# Patient Record
Sex: FEMALE | Race: WHITE | NOT HISPANIC OR LATINO | Employment: FULL TIME | URBAN - METROPOLITAN AREA
[De-identification: names, ages, dates, MRNs, and addresses within clinical notes are randomized per-mention and may not be internally consistent; named-entity substitution may affect disease eponyms.]

---

## 2017-01-13 ENCOUNTER — OFFICE VISIT (OUTPATIENT)
Dept: OPTOMETRY | Facility: CLINIC | Age: 42
End: 2017-01-13

## 2017-01-13 DIAGNOSIS — H52.213 IRREGULAR ASTIGMATISM, BILATERAL: ICD-10-CM

## 2017-01-13 DIAGNOSIS — H52.03 HYPEROPIA WITH ASTIGMATISM, BILATERAL: ICD-10-CM

## 2017-01-13 DIAGNOSIS — H52.203 HYPEROPIA WITH ASTIGMATISM, BILATERAL: ICD-10-CM

## 2017-01-13 DIAGNOSIS — Z98.890 HISTORY OF REFRACTIVE SURGERY: Primary | ICD-10-CM

## 2017-01-13 ASSESSMENT — REFRACTION_CURRENTRX
OD_BASECURVE: 8.1
OS_AXIS: 175
OD_BRAND: SPECIALEYES TORIC
OD_CYLINDER: -2.75
OD_CYLINDER: -2.25
OD_CYLINDER: -2.75
OD_SPHERE: +1.00
OD_AXIS: 040
OD_DIAMETER: 15.4
OD_DIAMETER: 14.3
OS_SPHERE: +1.25
OD_BASECURVE: 9.5
OS_DIAMETER: 15.4
OD_AXIS: 045
OS_BRAND: BIOFINITY TORIC TRIAL
OS_CYLINDER: -1.75
OS_AXIS: 175
OD_BRAND: BIOFINITY TORIC TRIAL
OD_SPHERE: +1.00
OS_SPHERE: +1.00
OD_SPHERE: +0.50
OS_DIAMETER: 14.3
OD_BASECURVE: 8.7
OS_BASECURVE: 8.7
OS_SPHERE: +1.25
OS_BASECURVE: 8.1
OD_AXIS: 045
OS_DIAMETER: 14.5
OD_DIAMETER: 14.5
OS_BRAND: SPECIALEYES TORIC
OS_BASECURVE: 9.5
OS_AXIS: 180

## 2017-01-13 ASSESSMENT — SLIT LAMP EXAM - LIDS
COMMENTS: NORMAL
COMMENTS: NORMAL

## 2017-01-13 ASSESSMENT — REFRACTION_MANIFEST
OS_SPHERE: -0.75
OD_AXIS: 125
OD_SPHERE: -1.25
OS_AXIS: 080
OD_CYLINDER: +2.50
OS_CYLINDER: +2.00

## 2017-01-13 ASSESSMENT — VISUAL ACUITY
OS_SC: 20/40
OD_CC: 20/25-2
OD_SC: 20/80
CORRECTION_TYPE: CONTACTS
METHOD: SNELLEN - LINEAR
OS_CC: 20/25-1

## 2017-01-13 NOTE — PROGRESS NOTES
A/P  1.) s/p PRK OU with residual correction OD>OS  -Fairly stable refraction over past month, corrects to approx 20/30 OD, 20/20- OS  -Failed with Biofinity Toric/Oasys Astig  -Initial set of Specialeyes improved vision, but fluctuating with unstable fit  -Much improved fit with post-refractive surgery Specialeyes lenses, will order trials with updated Rx    Recheck 2 months for Rx stability

## 2017-04-20 ENCOUNTER — OFFICE VISIT (OUTPATIENT)
Dept: OPHTHALMOLOGY | Facility: CLINIC | Age: 42
End: 2017-04-20
Attending: OPHTHALMOLOGY

## 2017-04-20 DIAGNOSIS — H04.123 DRY EYE SYNDROME, BILATERAL: ICD-10-CM

## 2017-04-20 DIAGNOSIS — Z48.810 AFTERCARE FOLLOWING SURGERY OF A SENSORY ORGAN: Primary | ICD-10-CM

## 2017-04-20 PROCEDURE — 99213 OFFICE O/P EST LOW 20 MIN: CPT | Mod: ZF

## 2017-04-20 ASSESSMENT — VISUAL ACUITY
OS_CC: J1
OD_PH_SC: 20/50
OS_SC: 20/40
METHOD: SNELLEN - LINEAR
OS_PH_SC: 20/30
OD_SC: 20/100
OS_SC+: +3
OD_CC: J3

## 2017-04-20 ASSESSMENT — SLIT LAMP EXAM - LIDS
COMMENTS: NORMAL
COMMENTS: NORMAL

## 2017-04-20 ASSESSMENT — TONOMETRY
OS_IOP_MMHG: 11
IOP_METHOD: TONOPEN
OD_IOP_MMHG: 9

## 2017-04-20 ASSESSMENT — CONF VISUAL FIELD
OS_NORMAL: 1
OD_NORMAL: 1

## 2017-04-20 NOTE — MR AVS SNAPSHOT
After Visit Summary   4/20/2017    Zara Roberts    MRN: [id number]           Patient Information     Date Of Birth          1975        Visit Information        Provider Department      4/20/2017 8:30 AM Rene Pena MD [facility]        Today's Diagnoses     Aftercare following surgery of a sensory organ - Both Eyes    -  1    Dry eye syndrome, bilateral           Follow-ups after your visit        Your next 10 appointments already scheduled     Apr 28, 2017  8:30 AM CDT   Return Visit with Saima Goldsmith OD   Eye Clinic ([facility])    [facility] 9th Fl  Clinic 9a  [address] [place] [address]   [phone]              Who to contact     Please call your clinic at [phone] to:    Ask questions about your health    Make or cancel appointments    Discuss your medicines    Learn about your test results    Speak to your doctor   If you have compliments or concerns about an experience at your clinic, or if you wish to file a complaint, please contact [facility] Patient Relations at [phone] or email us at [email or web address]         Additional Information About Your Visit        MyChart Information     [employer] gives you secure access to your electronic health record. If you see a primary care provider, you can also send messages to your care team and make appointments. If you have questions, please call your primary care clinic.  If you do not have a primary care provider, please call [phone] and they will assist you.      [employer] is an electronic gateway that provides easy, online access to your medical records. With [employer] (formerly [employer]), you can request a clinic appointment, read your test results, renew a prescription or communicate with your care team.     To access your existing account, please contact your [facility] Clinic or call [phone] for assistance.         Care EveryWhere ID     This is your Care EveryWhere ID. This could be used by other organizations to access your [place] medical records  [id number]         Blood Pressure from Last 3 Encounters:   09/03/14 122/80   01/24/14 104/68   01/11/13 110/70    Weight from Last 3 Encounters:   09/03/14 67.1 kg (148 lb)   01/24/14 64.4 kg (142 lb)   01/11/13 63.4 kg (139 lb 12 oz)              Today, you had the following     No orders found for display       Primary Care Provider Office Phone # Fax #    Evelyn Julian [phone]       [facility] [address] [facility] [address]        Thank you!     Thank you for choosing EYE CLINIC  for your care. Our goal is always to provide you with excellent care. Hearing back from our patients is one way we can continue to improve our services. Please take a few minutes to complete the written survey that you may receive in the mail after your visit with us. Thank you!             Your Updated Medication List - Protect others around you: Learn how to safely use, store and throw away your medicines at www.disposemymeds.org.          This list is accurate as of: 4/20/17 11:59 PM.  Always use your most recent med list.                   Brand Name Dispense Instructions for use    ibuprofen 200 MG tablet   Generic drug:  ibuprofen      Take 200 mg by mouth every 4 hours as needed.       prednisoLONE acetate 1 % ophthalmic susp    PRED FORTE    1 Bottle    Place 1 drop into both eyes 4 times daily       TYLENOL 325 MG tablet   Generic drug:  acetaminophen      Take 325-650 mg by mouth every 6 hours as needed.

## 2017-04-20 NOTE — NURSING NOTE
Chief Complaints and History of Present Illnesses   Patient presents with     Follow Up For     s/p  PRK, Both eyes     HPI    Affected eye(s):  Both   Symptoms:     Blurred vision   Decreased vision   No floaters   No flashes   No tearing   Dryness   No photophobia      Duration:  4 months         Comments:  Pt stated right eye vision is hazy, left eye stable over the last 4 months.  AT's 2 times daily.  Antoni Saunders  8:36 AM April 20, 2017

## 2017-04-20 NOTE — PROGRESS NOTES
HPI: Zara Roberts is a 41 year old female with history of high myopia who presents in follow-up after PRK     Vision is still blurry OD but stable since last visit.    POHx:  PRK OU 9/26/2016    Assessment & Plan   Zara Roberts is a 41 year old female with the following diagnoses:     1. POM #7 PRK, Both eyes     - Persistent corneal haze OD and irregular astigmatism right eye  - due to delayed healing, CTL falling out early in post op course   - Most recently Saw Dr. Goldsmith in Jan, tried several soft CTLs. Currently wearing Special eyes toric but dissatisfied with fit / comfort / vision   - [place] today shows inferior paracentral elevation with irregularity inferiorly    - gas permeable will likely get her to 20/20 vision right eye   - she will again f/u with Dr. Goldsmith.    - discussed again possible enhancement, but will likely not improve vision to 20/20 in the right eye. Also given thinning right eye, would not recommend enhancement of the right eye. She elects to try additional CTL and glasses at this point.     RTC in 6 months or earlier if needed.     Ron Collins MD  Ophthalmology resident, PGY-3      ~~~~~~~~~~~~~~~~~~~~~~~~~~~~~~~~~~~~~~~~~~~~~~~~~~~~~~~~~~~~~~~~    Complete documentation of historical and exam elements from today's encounter can be found in the full encounter summary report (not reduplicated in this progress note). I personally obtained the chief complaint(s) and history of present illness.  I confirmed and edited as necessary the review of systems, past medical/surgical history, family history, social history, and examination findings as documented by others.  I examined the patient myself, and I personally reviewed the relevant tests, images, and reports as documented above. I formulated and edited as necessary the assessment and plan and discussed the findings and management plan with the patient and family.     Rene Pena MD, MA  Director, Cornea & Anterior  Segment  [facility] Department of Ophthalmology & Visual Neuroscience

## 2017-04-28 ENCOUNTER — OFFICE VISIT (OUTPATIENT)
Dept: OPTOMETRY | Facility: CLINIC | Age: 42
End: 2017-04-28

## 2017-04-28 DIAGNOSIS — Z98.890 HISTORY OF REFRACTIVE SURGERY: ICD-10-CM

## 2017-04-28 DIAGNOSIS — H52.213 IRREGULAR ASTIGMATISM, BILATERAL: Primary | ICD-10-CM

## 2017-04-28 ASSESSMENT — REFRACTION_CURRENTRX
OS_BASECURVE: 7.7
OD_BRAND: SPECIALEYES TORIC
OS_BRAND: SPECIALEYES TORIC
OD_AXIS: 035
OD_BRAND: ULTRAHEALTH FC
OS_DIAMETER: 14.5
OD_BASECURVE: 8.1
OS_DIAMETER: 14.3
OS_DIAMETER: 14.5
OS_SPHERE: -2.50
OD_DIAMETER: 14.5
OS_CYLINDER: -2.00
OD_BASECURVE: 255
OD_SPHERE: -3.50
OS_SPHERE: +1.25
OD_SPHERE: +1.25
OD_BASECURVE: 7.9
OS_BASECURVE: 205
OS_AXIS: 170
OD_DIAMETER: 14.3
OD_CYLINDER: -2.50
OS_BASECURVE: 8.1
OD_DIAMETER: 14.5
OD_BRAND: DUETTE
OS_BRAND: DUETTE
OS_BRAND: ULTRAHEALTH FC

## 2017-04-28 ASSESSMENT — VISUAL ACUITY
OD_CC: 20/60
OS_CC: 20/40
METHOD: SNELLEN - LINEAR
CORRECTION_TYPE: CONTACTS

## 2017-04-28 ASSESSMENT — SLIT LAMP EXAM - LIDS
COMMENTS: NORMAL
COMMENTS: NORMAL

## 2017-04-28 NOTE — PROGRESS NOTES
A/P  1.) s/p PRK OU with residual correction OD>OS  -Now with worsening irregular astig OD  -Refit to Duette with much improved BCVA (20/25 OD, 20/20 OS)  -Good initial fit/comfort  -Reviewed hybrid CL care and hygiene with pt  -Mail trials to pt    RTC 3-4 weeks for CL recheck

## 2017-04-28 NOTE — MR AVS SNAPSHOT
After Visit Summary   4/28/2017    Zara Roberts    MRN: [id number]           Patient Information     Date Of Birth          1975        Visit Information        Provider Department      4/28/2017 8:30 AM Saima Goldsmith, [facility]        Today's Diagnoses     Irregular astigmatism, bilateral    -  1    History of refractive surgery           Follow-ups after your visit        Who to contact     Please call your clinic at [phone] to:    Ask questions about your health    Make or cancel appointments    Discuss your medicines    Learn about your test results    Speak to your doctor   If you have compliments or concerns about an experience at your clinic, or if you wish to file a complaint, please contact [facility] Patient Relations at [phone] or email us at [email or web address]         Additional Information About Your Visit        MyChart Information     Liquidt gives you secure access to your electronic health record. If you see a primary care provider, you can also send messages to your care team and make appointments. If you have questions, please call your primary care clinic.  If you do not have a primary care provider, please call [phone] and they will assist you.      TicketFire is an electronic gateway that provides easy, online access to your medical records. With TicketFire, you can request a clinic appointment, read your test results, renew a prescription or communicate with your care team.     To access your existing account, please contact your [facility] Clinic or call [phone] for assistance.        Care EveryWhere ID     This is your Care EveryWhere ID. This could be used by other organizations to access your [place] medical records  [id number]         Blood Pressure from Last 3 Encounters:   09/03/14 122/80   01/24/14 104/68   01/11/13 110/70    Weight from Last 3 Encounters:    09/03/14 67.1 kg (148 lb)   01/24/14 64.4 kg (142 lb)   01/11/13 63.4 kg (139 lb 12 oz)              Today, you had the following     No orders found for display       Primary Care Provider Office Phone # Fax #    Evelyn Julian [phone]       [facility] [address]  [facility] [address]        Thank you!     Thank you for choosing EYE CLINIC  for your care. Our goal is always to provide you with excellent care. Hearing back from our patients is one way we can continue to improve our services. Please take a few minutes to complete the written survey that you may receive in the mail after your visit with us. Thank you!             Your Updated Medication List - Protect others around you: Learn how to safely use, store and throw away your medicines at www.disposemymeds.org.      Notice  As of 4/28/2017 10:49 AM    You have not been prescribed any medications.

## 2019-01-31 ENCOUNTER — TELEPHONE (OUTPATIENT)
Dept: FAMILY MEDICINE | Facility: CLINIC | Age: 44
End: 2019-01-31

## 2019-01-31 NOTE — TELEPHONE ENCOUNTER
"Patient sent a web request today:    \"Annual Exam, Establish new Primary Care.\"    Patient would like to schedule with Debra Crum DO at  Clinic 2/15 (any time) or 4/16 (noon-5pm CST).    Please contact patient.    Thank you.    Central Scheduling  Dannielle LU  "

## 2019-04-10 ENCOUNTER — VIRTUAL VISIT (OUTPATIENT)
Dept: FAMILY MEDICINE | Facility: OTHER | Age: 44
End: 2019-04-10

## 2019-04-10 NOTE — PROGRESS NOTES
"Date:   Clinician: Dharmesh Juarez  Clinician NPI: [id number]  Patient: Zara Roberts  Patient : 1975  Patient Address: [address] [place] [address]  Patient Phone: [phone]  Visit Protocol: Eye conditions  Patient Summary:  Zara is a 43 year old (: 1975 ) female who initiated a Visit for stye.  When asked the question \"Please sign me up to receive news, health information and promotions. \", Zara responded \"Yes\".   A synchronous visit is necessary because the patient reported the following abnormal symptoms:   Sensitivity to light (requires clarification)   Images of her eye condition were uploaded.   Her symptoms started 1-2 days ago and affect the left eye. The symptoms consist of eyelid swelling, light sensitivity, and itchy eye(s). She also feels feverish but her temperature could not be measured.   Symptom details   Itchiness: Zara has seasonal allergies or hay fever.   Denied symptoms include drainage coming from the eye(s), eye redness, bumps on the eyelid, and eye pain. Zara does not have subconjunctival hemorrhage. Visual acuity was unable to be evaluated.   Precipitating events  Just before the symptoms started, she got dust or dirt in her eyes.   She has not had a recent eye surgery, eye injury, and cold or ear infection. She does not wear contact lenses.   Pertinent medical history  Zara has not ever been diagnosed with glaucoma.   Zara has been using medication to treat her current symptoms. Medication used to treat current symptoms as reported by the patient (free text): Zyrtic (over the counter allergy as I thought this may be realted to allergies, but its not helping the discomfort)   Medication efficacy as reported by the patient (free text): not helping   Zara does not require proof of evaluation of her eye condition before returning to school, work, or .   Zara does not smoke or use smokeless tobacco.   She denies pregnancy " and denies breastfeeding. She has menstruated in the past month.   Additional information as reported by the patient (free text): There is slight redness behind upper lid which is where it feels like there is something there.  Overnight I woke in severe discomfort, tried to flush eye, flushing eye helped a little. Over day today, it feels worse, but I see nothing in eye.   MEDICATIONS: Zyrtec oral, Daily Multivitamin-Minerals oral, ALLERGIES: NKDA  Clinician Response:  Dear Zara,  I am sorry you are not feeling well. Your health is our priority. To determine the most appropriate care for you, I would like you to be seen in person to further discuss your health history and symptoms.  You will not be charged for this Visit. Thank you for trusting us with your care.   Diagnosis: Refer for additional evaluation  Diagnosis ICD: R69  Triage Notes: I reviewed the patient's history, verified their identity, and explained the phone visit process.    Patient referred out  Synchronous Triage: phone, status: completed, duration: 175 seconds

## 2019-04-16 ENCOUNTER — HOSPITAL ENCOUNTER (OUTPATIENT)
Dept: MAMMOGRAPHY | Facility: CLINIC | Age: 44
Discharge: HOME | End: 2019-04-16
Attending: PHYSICIAN ASSISTANT | Admitting: PHYSICIAN ASSISTANT
Payer: COMMERCIAL

## 2019-04-16 ENCOUNTER — OFFICE VISIT (OUTPATIENT)
Dept: FAMILY MEDICINE | Facility: CLINIC | Age: 44
End: 2019-04-16
Payer: COMMERCIAL

## 2019-04-16 VITALS
WEIGHT: 158 LBS | SYSTOLIC BLOOD PRESSURE: 127 MMHG | HEIGHT: 70 IN | OXYGEN SATURATION: 97 % | TEMPERATURE: 98.1 F | BODY MASS INDEX: 22.62 KG/M2 | DIASTOLIC BLOOD PRESSURE: 82 MMHG | HEART RATE: 90 BPM

## 2019-04-16 DIAGNOSIS — Z12.31 VISIT FOR SCREENING MAMMOGRAM: ICD-10-CM

## 2019-04-16 DIAGNOSIS — Z80.8 FH: THYROID CANCER: ICD-10-CM

## 2019-04-16 DIAGNOSIS — D50.9 IRON DEFICIENCY ANEMIA, UNSPECIFIED IRON DEFICIENCY ANEMIA TYPE: ICD-10-CM

## 2019-04-16 DIAGNOSIS — Z12.4 CERVICAL CANCER SCREENING: ICD-10-CM

## 2019-04-16 DIAGNOSIS — Z00.00 ROUTINE GENERAL MEDICAL EXAMINATION AT A HEALTH CARE FACILITY: Primary | ICD-10-CM

## 2019-04-16 LAB
ERYTHROCYTE [DISTWIDTH] IN BLOOD BY AUTOMATED COUNT: 17.3 % (ref 10–15)
HCT VFR BLD AUTO: 33.7 % (ref 35–47)
HGB BLD-MCNC: 10.7 G/DL (ref 11.7–15.7)
MCH RBC QN AUTO: 26.2 PG (ref 26.5–33)
MCHC RBC AUTO-ENTMCNC: 31.8 G/DL (ref 31.5–36.5)
MCV RBC AUTO: 82 FL (ref 78–100)
PLATELET # BLD AUTO: 279 10E9/L (ref 150–450)
RBC # BLD AUTO: 4.09 10E12/L (ref 3.8–5.2)
WBC # BLD AUTO: 5.5 10E9/L (ref 4–11)

## 2019-04-16 PROCEDURE — 82728 ASSAY OF FERRITIN: CPT | Performed by: PHYSICIAN ASSISTANT

## 2019-04-16 PROCEDURE — 99386 PREV VISIT NEW AGE 40-64: CPT | Performed by: PHYSICIAN ASSISTANT

## 2019-04-16 PROCEDURE — G0145 SCR C/V CYTO,THINLAYER,RESCR: HCPCS | Performed by: PHYSICIAN ASSISTANT

## 2019-04-16 PROCEDURE — 36415 COLL VENOUS BLD VENIPUNCTURE: CPT | Performed by: PHYSICIAN ASSISTANT

## 2019-04-16 PROCEDURE — 77067 SCR MAMMO BI INCL CAD: CPT

## 2019-04-16 PROCEDURE — 85027 COMPLETE CBC AUTOMATED: CPT | Performed by: PHYSICIAN ASSISTANT

## 2019-04-16 PROCEDURE — 84443 ASSAY THYROID STIM HORMONE: CPT | Performed by: PHYSICIAN ASSISTANT

## 2019-04-16 PROCEDURE — 87624 HPV HI-RISK TYP POOLED RSLT: CPT | Performed by: PHYSICIAN ASSISTANT

## 2019-04-16 ASSESSMENT — MIFFLIN-ST. JEOR: SCORE: 1452.17

## 2019-04-16 NOTE — PROGRESS NOTES
SUBJECTIVE:   CC: Zara Roberts is an 43 year old woman who presents for preventive health visit.     Has hx of MATTIE felt to be due to menorrhagia in the past but still lasted 6-7 days  Has tried to take iron in the past but would get too constipated  She does take MVI with iron  She had a tubal ligation  She has a 15 yo boy and twin 12 yo sons  Has glucose and lipids check at work so next time done will have sent to me  Has never had a mammogram    Healthy Habits:     Getting at least 3 servings of Calcium per day:  Yes    Bi-annual eye exam:  Yes    Dental care twice a year:  Yes    Sleep apnea or symptoms of sleep apnea:  None    Diet:  Regular (no restrictions)    Frequency of exercise:  2-3 days/week    Duration of exercise:  15-30 minutes    Taking medications regularly:  Not Applicable    Medication side effects:  Not applicable    PHQ-2 Total Score: 1    Additional concerns today:  Yes      Today's PHQ-2 Score:   PHQ-2 (  [employer]) 2019   Q1: Little interest or pleasure in doing things 0   Q2: Feeling down, depressed or hopeless 0   PHQ-2 Score 0   Q1: Little interest or pleasure in doing things -   Q2: Feeling down, depressed or hopeless -   PHQ-2 Score -       Abuse: Current or Past(Physical, Sexual or Emotional)- No  Do you feel safe in your environment? Yes    Social History     Tobacco Use     Smoking status: Former Smoker     Packs/day: 0.50     Years: 5.00     Pack years: 2.50     Types: Cigarettes     Last attempt to quit: 2013     Years since quittin.9     Smokeless tobacco: Never Used   Substance Use Topics     Alcohol use: Yes     Comment: 5 drinks weekly           AUDIT - Alcohol Use Disorders Identification Test - Reproduced from the World Health Organization Audit 2001 (Second Edition) 2019   1.  How often do you have a drink containing alcohol? 4 or more times a week   2.  How many drinks containing alcohol do you have on a typical day when you are drinking? 1 or  2   3.  How often do you have five or more drinks on one occasion? Never   4.  How often during the last year have you found that you were not able to stop drinking once you had started? Never   5.  How often during the last year have you failed to do what was normally expected of you because of drinking? Never   6.  How often during the last year have you needed a first drink in the morning to get yourself going after a heavy drinking session? Never   7.  How often during the last year have you had a feeling of guilt or remorse after drinking? Never   8.  How often during the last year have you been unable to remember what happened the night before because of your drinking? Never   9.  Have you or someone else been injured because of your drinking? No   10. Has a relative, friend, doctor or other health care worker been concerned about your drinking or suggested you cut down? No   TOTAL SCORE 4       Reviewed orders with patient.  Reviewed health maintenance and updated orders accordingly - Yes      Mammogram not appropriate for this patient based on age.    Pertinent mammograms are reviewed under the imaging tab.  History of abnormal Pap smear: NO - age 30- 65 PAP every 3 years recommended  PAP / HPV 1/24/2014   PAP NIL     Reviewed and updated as needed this visit by clinical staff  Tobacco  Allergies  Meds  Problems  Med Hx  Surg Hx         Reviewed and updated as needed this visit by Provider  Meds  Problems  Med Hx  Surg Hx            Review of Systems  CONSTITUTIONAL: NEGATIVE for fever, chills, change in weight  INTEGUMENTARU/SKIN: NEGATIVE for worrisome rashes, moles or lesions  EYES: NEGATIVE for vision changes or irritation  ENT: NEGATIVE for ear, mouth and throat problems  RESP: NEGATIVE for significant cough or SOB  BREAST: NEGATIVE for masses, tenderness or discharge  CV: NEGATIVE for chest pain, palpitations or peripheral edema  GI: NEGATIVE for nausea, abdominal pain, heartburn, or change  "in bowel habits  : NEGATIVE for unusual urinary or vaginal symptoms. Periods are regular.  MUSCULOSKELETAL: NEGATIVE for significant arthralgias or myalgia  NEURO: NEGATIVE for weakness, dizziness or paresthesias  PSYCHIATRIC: NEGATIVE for changes in mood or affect     OBJECTIVE:   /82 (BP Location: Right arm, Patient Position: Sitting, Cuff Size: Adult Regular)   Pulse 90   Temp 98.1  F (36.7  C) (Oral)   Ht 1.77 m (5' 9.7\")   Wt 71.7 kg (158 lb)   LMP 03/29/2019 (Exact Date)   SpO2 97%   Breastfeeding? No   BMI 22.87 kg/m    Physical Exam  GENERAL: healthy, alert and no distress  EYES: Eyes grossly normal to inspection, PERRL and conjunctivae and sclerae normal  HENT: ear canals and TM's normal, nose and mouth without ulcers or lesions  NECK: no adenopathy, no asymmetry, masses, or scars and thyroid normal to palpation  RESP: lungs clear to auscultation - no rales, rhonchi or wheezes  BREAST: normal without masses, tenderness or nipple discharge and no palpable axillary masses or adenopathy  CV: regular rate and rhythm, normal S1 S2, no S3 or S4, no murmur, click or rub, no peripheral edema and peripheral pulses strong  ABDOMEN: soft, nontender, no hepatosplenomegaly, no masses and bowel sounds normal  MS: no gross musculoskeletal defects noted, no edema  SKIN: no suspicious lesions or rashes  NEURO: Normal strength and tone, mentation intact and speech normal  PSYCH: mentation appears normal, affect normal/bright        ASSESSMENT/PLAN:   Assessment and Plan:     (Z00.00) Routine general medical examination at a health care facility  (primary encounter diagnosis)  Comment:   Plan: Recd pt go for her first mammogram today.  She has biometric screening at work and labs done there.    (D50.9) Iron deficiency anemia, unspecified iron deficiency anemia type  Comment:   Plan: CBC with platelets, Ferritin        Periods are not as heavy. Has had trouble tolerating oral iron.    (Z12.4) Cervical cancer " "screening  Comment:   Plan: Pap imaged thin layer screen with HPV -         recommended age 30 - 65 years (select HPV order        below), HPV High Risk Types DNA Cervical            (Z80.8) FH: thyroid cancer  Comment:   Plan: TSH                COUNSELING:  Reviewed preventive health counseling, as reflected in patient instructions    BP Readings from Last 1 Encounters:   04/16/19 127/82     Estimated body mass index is 22.87 kg/m  as calculated from the following:    Height as of this encounter: 1.77 m (5' 9.7\").    Weight as of this encounter: 71.7 kg (158 lb).           reports that she quit smoking about 5 years ago. Her smoking use included cigarettes. She has a 2.50 pack-year smoking history. She has never used smokeless tobacco.      Counseling Resources:  ATP IV Guidelines  Pooled Cohorts Equation Calculator  Breast Cancer Risk Calculator  FRAX Risk Assessment  ICSI Preventive Guidelines  Dietary Guidelines for Americans, 2010  USDA's MyPlate  ASA Prophylaxis  Lung CA Screening    Debra Crum PA-C  [facility]  "

## 2019-04-17 LAB
FERRITIN SERPL-MCNC: 12 NG/ML (ref 12–150)
TSH SERPL DL<=0.005 MIU/L-ACNC: 0.75 MU/L (ref 0.4–4)

## 2019-04-17 NOTE — RESULT ENCOUNTER NOTE
Reva,    Your TSH (thyroid blood test) was normal.  Your hemoglobin was low at 10.7 which is actually stable for your.  Your iron stores (ferritin) were on the low side.  See if you can tolerate taking slow release iron every other day.  This is available over the counter.    Let me know if you have any questions.    Debra Crum PA-C

## 2019-04-18 LAB
COPATH REPORT: NORMAL
PAP: NORMAL

## 2019-04-19 LAB
FINAL DIAGNOSIS: NORMAL
HPV HR 12 DNA CVX QL NAA+PROBE: NEGATIVE
HPV16 DNA SPEC QL NAA+PROBE: NEGATIVE
HPV18 DNA SPEC QL NAA+PROBE: NEGATIVE
SPECIMEN DESCRIPTION: NORMAL
SPECIMEN SOURCE CVX/VAG CYTO: NORMAL

## 2019-10-04 ENCOUNTER — HEALTH MAINTENANCE LETTER (OUTPATIENT)
Age: 44
End: 2019-10-04

## 2019-11-18 ENCOUNTER — THERAPY VISIT (OUTPATIENT)
Dept: PHYSICAL THERAPY | Facility: CLINIC | Age: 44
End: 2019-11-18
Payer: COMMERCIAL

## 2019-11-18 DIAGNOSIS — M54.2 CERVICAL PAIN: ICD-10-CM

## 2019-11-18 PROCEDURE — 97161 PT EVAL LOW COMPLEX 20 MIN: CPT | Mod: GP | Performed by: PHYSICAL THERAPIST

## 2019-11-18 PROCEDURE — 97110 THERAPEUTIC EXERCISES: CPT | Mod: GP | Performed by: PHYSICAL THERAPIST

## 2019-11-18 PROCEDURE — 97140 MANUAL THERAPY 1/> REGIONS: CPT | Mod: GP | Performed by: PHYSICAL THERAPIST

## 2019-11-19 PROBLEM — M54.2 CERVICAL PAIN: Status: ACTIVE | Noted: 2019-11-19

## 2019-11-19 NOTE — PROGRESS NOTES
[facility] Initial Evaluation    Physical Therapy Initial Examination/Evaluation  November 18, 2019    Zara Roberts  is a 44 year old  female referred to physical therapy as a self referral for treatment of neck pain and headaches.      DOI/onset most recently 10-18-19.   Mechanism of injury No specific incident. Patient has a hx of neck pain off and on x 10 years with increased computer work at her job. Normally a few chiropractic visits resolves the pain. This time she has had 4 chiropractic treatments with no relief and has daily headaches which she normally does not have.   DOS n/a  Prior treatment 4 chiropractic treatments with adjustments. Effect of prior treatment usually beneficial. This time it has not helped prompting patient to seek PT.    Chief Complaint:   Neck pain and headaches.   Pain location: B cervical/scapular musculature.  Quality: sharp along with dull headaches  Constant/Intermittent: constant but the severity fluctuates.  Time of day: worse as day progresses  Symptoms have worsened since onset.    Current pain 8/10.  Pain at best 4/10.  Pain at worst 8/10.    Symptoms aggravated by computer work.    Symptoms improved with movement/activity.     Social history:   with 3 children.    Occupation: .  Job duties:  Computer/desk work.    Patient having difficulty with ADLs: none.    Patient's goals are to reduce pain and headaches.    Patient reports general health as good.  Related medical history 10 year hx of neck pain off and on. Patient has an average of 2 episodes per year    Surgical History:  none.    Imaging: none.    Medications:  none.       Return to MD:  Self referral.      Clinical Impression: Patient should do well with a comprehensive exercise program in PT along with manual therapy. If progress is not seen in3-4 weeks she will see her MD for diagnostics.    Subjective:      General health as reported by patient is good.      Other  surgery history details: cesarian 2008.  Current medications:  Anti-inflammatory.   Primary job tasks include:  Computer work and prolonged sitting.           Patient is .                           Objective:  Standing Alignment:    Cervical/Thoracic:  Forward head and cervical lordosis decreased  Shoulder/UE:  Rounded shoulders                  Flexibility/Screens:   Positive screens:  Cervical and Thoracic  Upper Extremity:    Decreased left upper extremity flexibility at:  Pectoralis Major    Decreased right upper extremity flexibility present at:  Pectoralis Major    Spine:  Decreased left spine flexibility:  Rhomboids; Upper Trap and Levator    Decreased right spine flexibility:  Rhomboids; Upper Trap and Levator                  Cervical/Thoracic Evaluation    AROM:  AROM Cervical:    Flexion:            75%  Extension:       Normal  Rotation:         Left: normal     Right: 75%  Side Bend:      Left: 75%     Right:  75%    Strength: B scapular stabilizers 4/5  Headaches: cervical  Cervical Myotomes:  normal                      Cervical Dermatomes:  normal                    Cervical Palpation:    Tenderness present at Left:    Rhomboids; Upper Trap; Erector Spinae and Suboccipitals  Tenderness present at Right:    Rhomboids; Upper Trap; Erector Spinae and Suboccipitals      Spinal Segmental Conclusions:  Pain with P/A glide C6-7, T 4-6  Level:  Hypo at C5, C6, C7, T3 and T4                                                General     ROS    Assessment/Plan:    Patient is a 44 year old female with cervical complaints.    Patient has the following significant findings with corresponding treatment plan.                Diagnosis 1:  Cervical pain  Pain -  manual therapy, self management and education  Decreased ROM/flexibility - manual therapy and therapeutic exercise  Decreased strength - therapeutic exercise and therapeutic activities  Impaired muscle performance - neuro re-education  Decreased  function - therapeutic activities  Impaired posture - neuro re-education    Therapy Evaluation Codes:   1) History comprised of:   Personal factors that impact the plan of care:      Time since onset of symptoms.    Comorbidity factors that impact the plan of care are:      None.     Medications impacting care: None.  2) Examination of Body Systems comprised of:   Body structures and functions that impact the plan of care:      Cervical spine.   Activity limitations that impact the plan of care are:      Reading/Computer work.  3) Clinical presentation characteristics are:   Stable/Uncomplicated.  4) Decision-Making    Low complexity using standardized patient assessment instrument and/or measureable assessment of functional outcome.  Cumulative Therapy Evaluation is: Low complexity.    Previous and current functional limitations:  (See Goal Flow Sheet for this information)    Short term and Long term goals: (See Goal Flow Sheet for this information)     Communication ability:  Patient appears to be able to clearly communicate and understand verbal and written communication and follow directions correctly.  Treatment Explanation - The following has been discussed with the patient:   RX ordered/plan of care  Anticipated outcomes  Possible risks and side effects  This patient would benefit from PT intervention to resume normal activities.   Rehab potential is good.    Frequency:  1 X week, once daily  Duration:  for 6 weeks  Discharge Plan:  Achieve all LTG.  Independent in home treatment program.  Reach maximal therapeutic benefit.    Please refer to the daily flowsheet for treatment today, total treatment time and time spent performing 1:1 timed codes.

## 2019-11-25 ENCOUNTER — THERAPY VISIT (OUTPATIENT)
Dept: PHYSICAL THERAPY | Facility: CLINIC | Age: 44
End: 2019-11-25
Payer: COMMERCIAL

## 2019-11-25 DIAGNOSIS — M54.2 CERVICAL PAIN: ICD-10-CM

## 2019-11-25 PROCEDURE — 97140 MANUAL THERAPY 1/> REGIONS: CPT | Mod: GP | Performed by: PHYSICAL THERAPIST

## 2019-11-25 PROCEDURE — 97110 THERAPEUTIC EXERCISES: CPT | Mod: GP | Performed by: PHYSICAL THERAPIST

## 2019-11-25 PROCEDURE — 97112 NEUROMUSCULAR REEDUCATION: CPT | Mod: GP | Performed by: PHYSICAL THERAPIST

## 2019-12-09 ENCOUNTER — THERAPY VISIT (OUTPATIENT)
Dept: PHYSICAL THERAPY | Facility: CLINIC | Age: 44
End: 2019-12-09
Payer: COMMERCIAL

## 2019-12-09 DIAGNOSIS — M54.2 CERVICAL PAIN: ICD-10-CM

## 2019-12-09 PROCEDURE — 97110 THERAPEUTIC EXERCISES: CPT | Mod: GP | Performed by: PHYSICAL THERAPIST

## 2019-12-09 PROCEDURE — 97112 NEUROMUSCULAR REEDUCATION: CPT | Mod: GP | Performed by: PHYSICAL THERAPIST

## 2019-12-09 PROCEDURE — 97140 MANUAL THERAPY 1/> REGIONS: CPT | Mod: GP | Performed by: PHYSICAL THERAPIST

## 2019-12-10 NOTE — PROGRESS NOTES
Subjective:  HPI                    Objective:  System    Physical Exam    General     ROS    Assessment/Plan:    SUBJECTIVE  Subjective changes as noted by pt:   Pt. continued to feel better overall with less neck pain and headaches until the past 3-4 days where she has had her headaches return and had increased neck/upper back pain again for unknown reasons.   Current pain level: 7/10   Changes in function:  Yes (See Goal flowsheet attached for changes in current functional level)     Adverse reaction to treatment or activity:  None    OBJECTIVE  Changes in objective findings:  Palpation - MF tightness/tenderness in B cervical/scapular musculature and suboccipital area. ROM - cerv B SB 75%.      ASSESSMENT  Zara continues to require intervention to meet STG and LTG's: PT  Patient has experienced an exacerbation of symptoms.  Response to therapy has shown an improvement in  pain level and ROM   Progress made towards STG/LTG?  Yes (See Goal flowsheet attached for updates on achievement of STG and LTG)    PLAN  Current treatment program is being advanced to more complex exercises.    PTA/ATC plan:  N/A    Please refer to the daily flowsheet for treatment today, total treatment time and time spent performing 1:1 timed codes.

## 2019-12-17 ENCOUNTER — THERAPY VISIT (OUTPATIENT)
Dept: PHYSICAL THERAPY | Facility: CLINIC | Age: 44
End: 2019-12-17
Payer: COMMERCIAL

## 2019-12-17 DIAGNOSIS — M54.2 CERVICAL PAIN: ICD-10-CM

## 2019-12-17 PROCEDURE — 97110 THERAPEUTIC EXERCISES: CPT | Mod: GP | Performed by: PHYSICAL THERAPIST

## 2019-12-17 PROCEDURE — 97140 MANUAL THERAPY 1/> REGIONS: CPT | Mod: GP | Performed by: PHYSICAL THERAPIST

## 2020-03-26 PROBLEM — M54.2 CERVICAL PAIN: Status: RESOLVED | Noted: 2019-11-19 | Resolved: 2020-03-26

## 2020-03-26 NOTE — PROGRESS NOTES
Patient has not returned since the visit on 12-13-19. Therefore we will discharge patient from PT at this time and resolve the episode.

## 2020-11-08 ENCOUNTER — HEALTH MAINTENANCE LETTER (OUTPATIENT)
Age: 45
End: 2020-11-08

## 2020-11-17 ENCOUNTER — VIRTUAL VISIT (OUTPATIENT)
Dept: FAMILY MEDICINE | Facility: OTHER | Age: 45
End: 2020-11-17

## 2020-11-17 DIAGNOSIS — Z20.822 SUSPECTED 2019-NCOV INFECTION: Primary | ICD-10-CM

## 2020-11-17 PROCEDURE — U0003 INFECTIOUS AGENT DETECTION BY NUCLEIC ACID (DNA OR RNA); SEVERE ACUTE RESPIRATORY SYNDROME CORONAVIRUS 2 (SARS-COV-2) (CORONAVIRUS DISEASE [COVID-19]), AMPLIFIED PROBE TECHNIQUE, MAKING USE OF HIGH THROUGHPUT TECHNOLOGIES AS DESCRIBED BY CMS-2020-01-R: HCPCS | Mod: 90 | Performed by: PATHOLOGY

## 2020-11-17 PROCEDURE — 99000 SPECIMEN HANDLING OFFICE-LAB: CPT | Performed by: PATHOLOGY

## 2020-11-17 NOTE — PROGRESS NOTES
"Date: 2020 09:02:06  Clinician: Dharmesh Juarez  Clinician NPI: [id number]  Patient: Zara Roberts  Patient : 1975  Patient Address: [address] [place] [address]  Patient Phone: [phone]  Visit Protocol: URI  Patient Summary:  Zara is a 45 year old ( : 1975 ) female who initiated a [facility] Visit for COVID-19 (Coronavirus) evaluation and screening. When asked the question \"Please sign me up to receive news, health information and promotions. \", Zara responded \"Yes\".    Zara states her symptoms started gradually 3-4 days ago. After her symptoms started, they improved and then got worse again.   Her symptoms consist of vomiting, rhinitis, myalgia, malaise, a sore throat, and a headache. She is experiencing difficulty breathing due to nasal congestion but she is not short of breath. Zara also feels feverish.   Symptom details     Nasal secretions: The color of her mucus is green.    Sore throat: Zara reports having mild throat pain (1-3 on a 10 point pain scale), does not have exudate on her tonsils, and can swallow liquids. She is not sure if the lymph nodes in her neck are enlarged. A rash has not appeared on the skin since the sore throat started.     Temperature: Her current temperature is 99.6 degrees Fahrenheit.     Headache: She states the headache is mild (1-3 on a 10 point pain scale).      Zara denies having facial pain or pressure, chills, teeth pain, ageusia, diarrhea, ear pain, wheezing, cough, nasal congestion, nausea, and anosmia. She also denies taking antibiotic medication in the past month, having recent facial or sinus surgery in the past 60 days, and having a sinus infection within the past year.   Precipitating events  Within the past week, Zara has not been exposed to someone with strep throat. She has not recently been exposed to someone with influenza. Zara has been in close contact with the following high risk individuals: people with " asthma, heart disease or diabetes.   Pertinent COVID-19 (Coronavirus) information  Zara does not work or volunteer as healthcare worker or a . In the past 14 days, Zara has not worked or volunteered at a healthcare facility or group living setting.   In the past 14 days, she also has not lived in a congregate living setting.   Zara has not had a close contact with a laboratory-confirmed COVID-19 patient within 14 days of symptom onset.    Since December 2019, Zara has not been tested for COVID-19 and has had upper respiratory infection (URI) or influenza-like illness.      Date(s) of previous URI or influenza-like illness (free-text): Late January 2020     Symptoms Zara experienced during previous URI or influenza-like illness as reported by the patient (free-text): congestion, feverish, light symptoms-I only remember because I stayed home from work one day which is unusual for me        Pertinent medical history  Zara typically gets a yeast infection when she takes antibiotics. She is not sure if she has used fluconazole (Diflucan) to treat previous yeast infections.   Zara does not need a return to work/school note.   Weight: 155 lbs   Zara does not smoke or use smokeless tobacco.   She denies pregnancy and denies breastfeeding. She is currently menstruating.   Additional information as reported by the patient (free text): I want to know if I am to be tested, will my family be tested as well?   Weight: 155 lbs    MEDICATIONS: Daily Multivitamin-Minerals oral, Zyrtec oral, ALLERGIES: NKDA  Clinician Response:  Dear Zara,   Your symptoms show that you may have coronavirus (COVID-19). This illness can cause fever, cough and trouble breathing. Many people get a mild case and get better on their own. Some people can get very sick.  What should I do?  We would like to test you for this virus.   1. Please call [phone] to schedule your visit. Explain that [facility]  "to have a COVID-19 test. Be ready to share your [facility] visit ID number.  * If you need to schedule in [facility] please call [phone] or for [employer] [place] employees please call [phone].  * If you need to schedule in the [place] area please call [phone]. [place] employees call [phone].  The following will serve as your written order for this COVID Test, ordered by me, for the indication of suspected COVID [Z20.828]: The test will be ordered in [facility], our electronic health record, after you are scheduled. It will show as ordered and authorized by Dante Green MD.  Order: COVID-19 (Coronavirus) PCR for SYMPTOMATIC testing from [facility].   2. When it's time for your COVID test:  Stay at least 6 feet away from others. (If someone will drive you to your test, stay in the backseat, as far away from the  as you can.)   Cover your mouth and nose with a mask, tissue or washcloth.  Go straight to the testing site. Don't make any stops on the way there or back.      3.Starting now: Stay home and away from others (self-isolate) until:   You've had no fever---and no medicine that reduces fever---for one full day (24 hours). And...   Your other symptoms have gotten better. For example, your cough or breathing has improved. And...   At least 10 days have passed since your symptoms started.       During this time, don't leave the house except for testing or medical care.   Stay in your own room, even for meals. Use your own bathroom if you can.   Stay away from others in your home. No hugging, kissing or shaking hands. No visitors.  Don't go to work, school or anywhere else.    Clean \"high touch\" surfaces often (doorknobs, counters, handles, etc.). Use a household cleaning spray or wipes. You'll find a full list of  on the EPA website: www.epa.gov/pesticide-registration/list-n-disinfectants-use-against-sars-cov-2.   Cover your mouth and nose with a mask, tissue or washcloth to avoid spreading germs.  Wash " your hands and face often. Use soap and water.  Caregivers in these groups are at risk for severe illness due to COVID-19:  o People 65 years and older  o People who live in a nursing home or long-term care facility  o People with chronic disease (lung, heart, cancer, diabetes, kidney, liver, immunologic)  o People who have a weakened immune system, including those who:   Are in cancer treatment  Take medicine that weakens the immune system, such as corticosteroids  Had a bone marrow or organ transplant  Have an immune deficiency  Have poorly controlled HIV or AIDS  Are obese (body mass index of 40 or higher)  Smoke regularly   o Caregivers should wear gloves while washing dishes, handling laundry and cleaning bedrooms and bathrooms.  o Use caution when washing and drying laundry: Don't shake dirty laundry, and use the warmest water setting that you can.  o For more tips, go to www.cdc.gov/coronavirus/2019-ncov/downloads/10Things.pdf.       How can I take care of myself?   Get lots of rest. Drink extra fluids (unless a doctor has told you not to).   Take Tylenol (acetaminophen) for fever or pain. If you have liver or kidney problems, ask your family doctor if it's okay to take Tylenol.   Adults can take either:    650 mg (two 325 mg pills) every 4 to 6 hours, or...   1,000 mg (two 500 mg pills) every 8 hours as needed.    Note: Don't take more than 3,000 mg in one day. Acetaminophen is found in many medicines (both prescribed and over-the-counter medicines). Read all labels to be sure you don't take too much.   For children, check the Tylenol bottle for the right dose. The dose is based on the child's age or weight.    If you have other health problems (like cancer, heart failure, an organ transplant or severe kidney disease): Call your specialty clinic if you don't feel better in the next 2 days.       Know when to call 911. Emergency warning signs include:    Trouble breathing or shortness of breath Pain or  pressure in the chest that doesn't go away Feeling confused like you haven't felt before, or not being able to wake up Bluish-colored lips or face.  Where can I get more information?   [facility] -- About COVID-19: [email or web address]   CDC -- What to Do If You're Sick: www.cdc.gov/coronavirus/2019-ncov/about/steps-when-sick.html   CDC -- Ending Home Isolation: www.cdc.gov/coronavirus/2019-ncov/hcp/disposition-in-home-patients.html   [facility] -- Caring for Someone: www.cdc.gov/coronavirus/2019-ncov/if-you-are-sick/care-for-someone.html   [facility] -- Interim Guidance for Hospital Discharge to Home: www.health.[facility].mn./diseases/coronavirus/hcp/hospdischarge.pdf   [facility] clinical trials (COVID-19 research studies): clinicalaffairs.[facility]-clinical-trials    Below are the COVID-19 hotlines at the [facility] ([facility]). Interpreters are available.    For health questions: Call 011-479-2984 or 1-864.362.9450 (7 a.m. to 7 p.m.) For questions about schools and childcare: Call 151-424-8616 or 1-578.883.6417 (7 a.m. to 7 p.m.)    Diagnosis: Contact with and (suspected) exposure to other viral communicable diseases  Diagnosis ICD: Z20.828

## 2020-11-18 LAB
SARS-COV-2 RNA SPEC QL NAA+PROBE: NOT DETECTED
SPECIMEN SOURCE: NORMAL

## 2020-11-19 ENCOUNTER — OFFICE VISIT (OUTPATIENT)
Dept: FAMILY MEDICINE | Facility: CLINIC | Age: 45
End: 2020-11-19
Payer: COMMERCIAL

## 2020-11-19 VITALS
SYSTOLIC BLOOD PRESSURE: 109 MMHG | DIASTOLIC BLOOD PRESSURE: 76 MMHG | OXYGEN SATURATION: 100 % | HEIGHT: 70 IN | WEIGHT: 167.3 LBS | TEMPERATURE: 98.9 F | BODY MASS INDEX: 23.95 KG/M2 | HEART RATE: 70 BPM

## 2020-11-19 DIAGNOSIS — Z13.6 CARDIOVASCULAR SCREENING; LDL GOAL LESS THAN 160: ICD-10-CM

## 2020-11-19 DIAGNOSIS — D50.9 IRON DEFICIENCY ANEMIA, UNSPECIFIED IRON DEFICIENCY ANEMIA TYPE: ICD-10-CM

## 2020-11-19 DIAGNOSIS — Z23 NEED FOR PROPHYLACTIC VACCINATION AND INOCULATION AGAINST INFLUENZA: ICD-10-CM

## 2020-11-19 DIAGNOSIS — R09.81 NASAL CONGESTION: ICD-10-CM

## 2020-11-19 DIAGNOSIS — Z00.00 ROUTINE GENERAL MEDICAL EXAMINATION AT A HEALTH CARE FACILITY: Primary | ICD-10-CM

## 2020-11-19 LAB
ALBUMIN SERPL-MCNC: 3.9 G/DL (ref 3.4–5)
ALP SERPL-CCNC: 63 U/L (ref 40–150)
ALT SERPL W P-5'-P-CCNC: 22 U/L (ref 0–50)
ANION GAP SERPL CALCULATED.3IONS-SCNC: 5 MMOL/L (ref 3–14)
AST SERPL W P-5'-P-CCNC: 18 U/L (ref 0–45)
BILIRUB SERPL-MCNC: 0.4 MG/DL (ref 0.2–1.3)
BUN SERPL-MCNC: 14 MG/DL (ref 7–30)
CALCIUM SERPL-MCNC: 9.2 MG/DL (ref 8.5–10.1)
CHLORIDE SERPL-SCNC: 107 MMOL/L (ref 94–109)
CHOLEST SERPL-MCNC: 218 MG/DL
CO2 SERPL-SCNC: 26 MMOL/L (ref 20–32)
CREAT SERPL-MCNC: 0.72 MG/DL (ref 0.52–1.04)
ERYTHROCYTE [DISTWIDTH] IN BLOOD BY AUTOMATED COUNT: 12.9 % (ref 10–15)
FERRITIN SERPL-MCNC: 17 NG/ML (ref 12–150)
GFR SERPL CREATININE-BSD FRML MDRD: >90 ML/MIN/{1.73_M2}
GLUCOSE SERPL-MCNC: 90 MG/DL (ref 70–99)
HCT VFR BLD AUTO: 36.7 % (ref 35–47)
HDLC SERPL-MCNC: 71 MG/DL
HGB BLD-MCNC: 12 G/DL (ref 11.7–15.7)
IRON SATN MFR SERPL: 8 % (ref 15–46)
IRON SERPL-MCNC: 31 UG/DL (ref 35–180)
LDLC SERPL CALC-MCNC: 125 MG/DL
MCH RBC QN AUTO: 31.1 PG (ref 26.5–33)
MCHC RBC AUTO-ENTMCNC: 32.7 G/DL (ref 31.5–36.5)
MCV RBC AUTO: 95 FL (ref 78–100)
NONHDLC SERPL-MCNC: 147 MG/DL
PLATELET # BLD AUTO: 255 10E9/L (ref 150–450)
POTASSIUM SERPL-SCNC: 4.1 MMOL/L (ref 3.4–5.3)
PROT SERPL-MCNC: 7.9 G/DL (ref 6.8–8.8)
RBC # BLD AUTO: 3.86 10E12/L (ref 3.8–5.2)
SODIUM SERPL-SCNC: 138 MMOL/L (ref 133–144)
TIBC SERPL-MCNC: 374 UG/DL (ref 240–430)
TRIGL SERPL-MCNC: 109 MG/DL
TSH SERPL DL<=0.005 MIU/L-ACNC: 0.77 MU/L (ref 0.4–4)
WBC # BLD AUTO: 5.8 10E9/L (ref 4–11)

## 2020-11-19 PROCEDURE — 84443 ASSAY THYROID STIM HORMONE: CPT | Performed by: PHYSICIAN ASSISTANT

## 2020-11-19 PROCEDURE — 90471 IMMUNIZATION ADMIN: CPT | Performed by: PHYSICIAN ASSISTANT

## 2020-11-19 PROCEDURE — 83550 IRON BINDING TEST: CPT | Performed by: PHYSICIAN ASSISTANT

## 2020-11-19 PROCEDURE — 80053 COMPREHEN METABOLIC PANEL: CPT | Performed by: PHYSICIAN ASSISTANT

## 2020-11-19 PROCEDURE — 90686 IIV4 VACC NO PRSV 0.5 ML IM: CPT | Performed by: PHYSICIAN ASSISTANT

## 2020-11-19 PROCEDURE — 83540 ASSAY OF IRON: CPT | Performed by: PHYSICIAN ASSISTANT

## 2020-11-19 PROCEDURE — 82728 ASSAY OF FERRITIN: CPT | Performed by: PHYSICIAN ASSISTANT

## 2020-11-19 PROCEDURE — 36415 COLL VENOUS BLD VENIPUNCTURE: CPT | Performed by: PHYSICIAN ASSISTANT

## 2020-11-19 PROCEDURE — 80061 LIPID PANEL: CPT | Performed by: PHYSICIAN ASSISTANT

## 2020-11-19 PROCEDURE — 99396 PREV VISIT EST AGE 40-64: CPT | Mod: 25 | Performed by: PHYSICIAN ASSISTANT

## 2020-11-19 PROCEDURE — 85027 COMPLETE CBC AUTOMATED: CPT | Performed by: PHYSICIAN ASSISTANT

## 2020-11-19 RX ORDER — FLUTICASONE PROPIONATE 50 MCG
1 SPRAY, SUSPENSION (ML) NASAL DAILY
Qty: 16 G | Refills: 4 | Status: SHIPPED | OUTPATIENT
Start: 2020-11-19 | End: 2022-04-07

## 2020-11-19 SDOH — HEALTH STABILITY: MENTAL HEALTH: HOW OFTEN DO YOU HAVE A DRINK CONTAINING ALCOHOL?: NOT ASKED

## 2020-11-19 SDOH — HEALTH STABILITY: MENTAL HEALTH: HOW MANY STANDARD DRINKS CONTAINING ALCOHOL DO YOU HAVE ON A TYPICAL DAY?: NOT ASKED

## 2020-11-19 SDOH — HEALTH STABILITY: MENTAL HEALTH: HOW OFTEN DO YOU HAVE 6 OR MORE DRINKS ON ONE OCCASION?: NOT ASKED

## 2020-11-19 ASSESSMENT — MIFFLIN-ST. JEOR: SCORE: 1489.12

## 2020-11-19 NOTE — PROGRESS NOTES
SUBJECTIVE:   CC: Zara Roberts is an 45 year old woman who presents for preventive health visit.     Pt has hx of MATTIE due to heavy menses    She is taking a new iron product called blood builder that doesn't make her constipated like ferrous sulfate.  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      Patient has been advised of split billing requirements and indicates understanding: Yes  Healthy Habits:     Getting at least 3 servings of Calcium per day:  Yes    Bi-annual eye exam:  NO    Dental care twice a year:  Yes    Sleep apnea or symptoms of sleep apnea:  None    Diet:  Regular (no restrictions)    Frequency of exercise:  6-7 days/week    Duration of exercise:  15-30 minutes    Taking medications regularly:  Not Applicable    Medication side effects:  Not applicable    PHQ-2 Total Score: 0        Today's PHQ-2 Score:   PHQ-2 (  [employer]) 2020   Q1: Little interest or pleasure in doing things 0   Q2: Feeling down, depressed or hopeless 0   PHQ-2 Score 0   Q1: Little interest or pleasure in doing things -   Q2: Feeling down, depressed or hopeless -   PHQ-2 Score -       Abuse: Current or Past (Physical, Sexual or Emotional) - No  Do you feel safe in your environment? Yes        Social History     Tobacco Use     Smoking status: Former Smoker     Packs/day: 0.50     Years: 5.00     Pack years: 2.50     Types: Cigarettes     Quit date: 2013     Years since quittin.5     Smokeless tobacco: Never Used   Substance Use Topics     Alcohol use: Yes     Comment: 1-2 drinks per day     If you drink alcohol do you typically have >3 drinks per day or >7 drinks per week? No    Alcohol Use 2020   Prescreen: >3 drinks/day or >7 drinks/week? -   Prescreen: >3 drinks/day or >7 drinks/week? No   AUDIT SCORE  -       Reviewed orders with patient.  Reviewed health maintenance and updated orders accordingly - Yes      Pertinent  mammograms are reviewed under the imaging tab.  History of abnormal Pap smear: NO - age 30- 65 PAP every 3 years recommended  PAP / HPV Latest Ref Rng & Units 2019   PAP - NIL NIL   HPV 16 DNA NEG:Negative Negative -   HPV 18 DNA NEG:Negative Negative -   OTHER HR HPV NEG:Negative Negative -     Reviewed and updated as needed this visit by clinical staff  Tobacco  Allergies  Meds  Problems             Reviewed and updated as needed this visit by Provider    Meds  Problems            Past Medical History:   Diagnosis Date     Iron deficiency anemia 2014     Seasonal allergies      Past Surgical History:   Procedure Laterality Date     C C-SEC+POSTPARTUM CARE,PREV C-SEC  2008    Twins -      PHOTOREFRACTIVE KERATECTOMY UNILATERAL STANDARD OR CUSTOMVUE W OR W/O MITOMYCIN Right 2016    Procedure: PHOTOREFRACTIVE KERATECTOMY UNILATERAL STANDARD OR CUSTOMVUE W OR W/O MITOMYCIN;  Surgeon: Rene Pena MD;  Location: [facility]     PHOTOREFRACTIVE KERATECTOMY UNILATERAL STANDARD OR CUSTOMVUE W OR W/O MITOMYCIN Left 2016    Procedure: PHOTOREFRACTIVE KERATECTOMY UNILATERAL STANDARD OR CUSTOMVUE W OR W/O MITOMYCIN;  Surgeon: Rene Pena MD;  Location: [facility]     TUBAL LIGATION  2008     WAVESCAN SCREENING Bilateral 2016    Procedure: WAVESCAN SCREENING;  Surgeon: Rene Pena MD;  Location: [facility]     Current Outpatient Medications   Medication Sig Dispense Refill     cetirizine HCl (ZYRTEC ALLERGY) 10 MG CAPS Take 10 mg by mouth daily as needed       fluticasone (FLONASE) 50 MCG/ACT nasal spray Spray 1 spray into both nostrils daily 16 g 4     Multiple Vitamins-Minerals (MULTIVITAMIN ADULT PO) Take 1 tablet by mouth daily           Review of Systems  CONSTITUTIONAL: NEGATIVE for fever, chills, change in weight  INTEGUMENTARU/SKIN: NEGATIVE for worrisome rashes, moles or lesions  EYES: NEGATIVE for vision changes or irritation  ENT: NEGATIVE for ear,  "mouth and throat problems  RESP: NEGATIVE for significant cough or SOB  BREAST: NEGATIVE for masses, tenderness or discharge  CV: NEGATIVE for chest pain, palpitations or peripheral edema  GI: NEGATIVE for nausea, abdominal pain, heartburn, or change in bowel habits  : NEGATIVE for unusual urinary or vaginal symptoms. Periods are regular.  MUSCULOSKELETAL: NEGATIVE for significant arthralgias or myalgia  NEURO: NEGATIVE for weakness, dizziness or paresthesias  PSYCHIATRIC: NEGATIVE for changes in mood or affect     OBJECTIVE:   /76 (BP Location: Right arm, Cuff Size: Adult Large)   Pulse 70   Temp 98.9  F (37.2  C) (Tympanic)   Ht 1.778 m (5' 10\")   Wt 75.9 kg (167 lb 4.8 oz)   LMP 11/11/2020 (Exact Date)   SpO2 100%   BMI 24.01 kg/m    Physical Exam  GENERAL: healthy, alert and no distress  EYES: Eyes grossly normal to inspection, PERRL and conjunctivae and sclerae normal  HENT: ear canals and TM's normal, nose and mouth without ulcers or lesions  NECK: no adenopathy, no asymmetry, masses, or scars and thyroid normal to palpation  RESP: lungs clear to auscultation - no rales, rhonchi or wheezes  BREAST: normal without masses, tenderness or nipple discharge and no palpable axillary masses or adenopathy  CV: regular rate and rhythm, normal S1 S2, no S3 or S4, no murmur, click or rub, no peripheral edema and peripheral pulses strong  ABDOMEN: soft, nontender, no hepatosplenomegaly, no masses and bowel sounds normal  MS: no gross musculoskeletal defects noted, no edema  SKIN: no suspicious lesions or rashes  NEURO: Normal strength and tone, mentation intact and speech normal  PSYCH: mentation appears normal, affect normal/bright        ASSESSMENT/PLAN:   Assessment and Plan:     (Z00.00) Routine general medical examination at a health care facility  (primary encounter diagnosis)  Comment: pap not due this year. Recd she schedule her mammogram.  Immun up to date.  Plan: CBC with platelets, Comprehensive " "metabolic         panel, TSH with free T4 reflex            (D50.9) Iron deficiency anemia, unspecified iron deficiency anemia type  Comment:   Plan: Ferritin, Iron and iron binding capacity           (R09.81) Nasal congestion  Comment:   Plan: fluticasone (FLONASE) 50 MCG/ACT nasal spray            (Z13.6) CARDIOVASCULAR SCREENING; LDL GOAL LESS THAN 160  Comment:   Plan: Lipid panel reflex to direct LDL Fasting            (Z23) Need for prophylactic vaccination and inoculation against influenza  Comment:   Plan: INFLUENZA VACCINE IM > 6 MONTHS VALENT IIV4         [98258], ADMIN 1st VACCINE            Patient has been advised of split billing requirements and indicates understanding: Yes  COUNSELING:  Reviewed preventive health counseling, as reflected in patient instructions    Estimated body mass index is 24.01 kg/m  as calculated from the following:    Height as of this encounter: 1.778 m (5' 10\").    Weight as of this encounter: 75.9 kg (167 lb 4.8 oz).        She reports that she quit smoking about 7 years ago. Her smoking use included cigarettes. She has a 2.50 pack-year smoking history. She has never used smokeless tobacco.      Counseling Resources:  ATP IV Guidelines  Pooled Cohorts Equation Calculator  Breast Cancer Risk Calculator  BRCA-Related Cancer Risk Assessment: FHS-7 Tool  FRAX Risk Assessment  ICSI Preventive Guidelines  Dietary Guidelines for Americans, 2010  USDA's MyPlate  ASA Prophylaxis  Lung CA Screening    Debra Crum PA-C  [facility]  "

## 2020-11-23 NOTE — RESULT ENCOUNTER NOTE
It was a pleasure seeing you for your physical examination.  I wanted to get back to you with your test results.      I am happy to report that your CBC or complete blood count is normal with no signs of anemia, leukemia or platelet abnormalities. Your chemistry panel shows no signs of diabetes.  Your blood salts, kidney tests, liver tests, and proteins are all fine.    Your total cholesterol is 218 with the normal range being below 200.  Your HDL or good cholesterol is 71 with the normal range being above 50.  Your LDL or bad cholesterol is 125 with the normal range being below 160.      Your thyroid (TSH) is normal.    Your iron levels have improved but still on the low side and your hemoglobin is normal.  I would recommend you take your iron supplements more regularly.    Debra Crum PA-C

## 2021-07-18 ENCOUNTER — HEALTH MAINTENANCE LETTER (OUTPATIENT)
Age: 46
End: 2021-07-18

## 2021-08-26 ENCOUNTER — E-VISIT (OUTPATIENT)
Dept: URGENT CARE | Facility: URGENT CARE | Age: 46
End: 2021-08-26
Payer: COMMERCIAL

## 2021-08-26 DIAGNOSIS — T63.484A ALLERGIC REACTION TO INSECT STING, UNDETERMINED INTENT, INITIAL ENCOUNTER: Primary | ICD-10-CM

## 2021-08-26 PROCEDURE — 99421 OL DIG E/M SVC 5-10 MIN: CPT | Performed by: NURSE PRACTITIONER

## 2021-08-26 RX ORDER — METHYLPREDNISOLONE 4 MG
TABLET, DOSE PACK ORAL
Qty: 21 TABLET | Refills: 0 | Status: SHIPPED | OUTPATIENT
Start: 2021-08-26 | End: 2022-04-07

## 2021-08-27 NOTE — PATIENT INSTRUCTIONS
Dear Zara Roberts    Having an allergic reaction to continue taking the Benadryl or Zyrtec remains to treat the allergic reaction.  I have also prescribed a low-dose steroid that will help reaction settle down. Please take as prescribed.      Thanks for choosing us as your health care partner,    Kathryn De Luna CNP

## 2021-09-11 ENCOUNTER — HEALTH MAINTENANCE LETTER (OUTPATIENT)
Age: 46
End: 2021-09-11

## 2021-12-20 ENCOUNTER — E-VISIT (OUTPATIENT)
Dept: URGENT CARE | Facility: CLINIC | Age: 46
End: 2021-12-20
Payer: COMMERCIAL

## 2021-12-20 ENCOUNTER — VIRTUAL VISIT (OUTPATIENT)
Dept: FAMILY MEDICINE | Facility: CLINIC | Age: 46
End: 2021-12-20
Payer: COMMERCIAL

## 2021-12-20 DIAGNOSIS — U07.1 INFECTION DUE TO 2019 NOVEL CORONAVIRUS: Primary | ICD-10-CM

## 2021-12-20 PROCEDURE — 99207 PR NO CHARGE LOS: CPT | Performed by: FAMILY MEDICINE

## 2021-12-20 PROCEDURE — 99213 OFFICE O/P EST LOW 20 MIN: CPT | Mod: 95 | Performed by: FAMILY MEDICINE

## 2021-12-20 NOTE — PROGRESS NOTES
Reva is a 46 year old who is being evaluated via a billable video visit.      How would you like to obtain your AVS? MyChart  If the video visit is dropped, the invitation should be resent by  Will anyone else be joining your video visit? No      Video Start Time: 2.10 PM    Assessment & Plan     Infection due to 2019 novel coronavirus  Pt says fever is down  Cough slowly getting better  Discussed  about Monoclonal ab  Rest/fluids  If sob or chest pain -GO to ER      Return in about 1 week (around 12/27/2021), or if symptoms worsen or fail to improve, for recheck/Please schedule appointment.    Frances Pettit MD  [facility]    Subjective   Reva is a 46 year old who presents for the following health issues  Pt was Diagnosed with covid   12-12-21   Fever ,Bodyaches,cough, chest congestion  Still has cough but better  No fever  No loss of longoria or smell  No Diarrhea or abdominal pain  Works From Home   Kids in sports    HPI         Review of Systems   CONSTITUTIONAL: NEGATIVE for fever, chills, change in weight  INTEGUMENTARY/SKIN: NEGATIVE for worrisome rashes, moles or lesions  ENT/MOUTH: as above  RESP:as above  CV: NEGATIVE for chest pain, palpitations or peripheral edema  GI: NEGATIVE for nausea, abdominal pain, heartburn, or change in bowel habits  PSYCHIATRIC: NEGATIVE for changes in mood or affect  ROS otherwise negative      Objective           Vitals:  No vitals were obtained today due to virtual visit.    Physical Exam   GENERAL: Healthy, alert and no distress  EYES: Eyes grossly normal to inspection.  No discharge or erythema, or obvious scleral/conjunctival abnormalities.  RESP: No audible wheeze, cough, or visible cyanosis.  No visible retractions or increased work of breathing.    SKIN: Visible skin clear. No significant rash, abnormal pigmentation or lesions.  NEURO: Cranial nerves grossly intact.  Mentation and speech appropriate for age.  PSYCH: Mentation appears normal, affect  normal/bright, judgement and insight intact, normal speech and appearance well-groomed.    Pt had positive covid test-saliva            Video-Visit Details    Type of service:  Video Visit    Video End Time:2:19 PM    Originating Location (pt. Location): Home    Distant Location (provider location):  [facility]     Platform used for Video Visit: [email or web address]   2:22 PM -visit complete

## 2021-12-20 NOTE — PATIENT INSTRUCTIONS
"MILD TO MODERATE ILLNESS WHO ARE NOT SEVERELY IMMUNOCOMPROMISED  Discharge Instructions for COVID-19 Patients  You have--or may have--COVID-19. Please follow the instructions listed below.   If you have a weakened immune system, discuss with your doctor any other actions you need to take.  How can I protect others?  If you have symptoms (fever, cough, body aches or trouble breathing):    Stay home and away from others (self-isolate) until:  ? Your other symptoms have resolved (gotten better). And   ? You've had no fever--and no medicine that reduces fever--for 1 full day (24 hours). And   ? At least 10 days have passed since your symptoms started. (You may need to wait 20 days. Follow the advice of your care team.)  If you don't show symptoms, but testing showed that you have COVID-19:    Stay home and away from others (self-isolate) until at least 10 days have passed since the date of your first positive COVID-19 test.  During this time    Stay in your own room, even for meals. Use your own bathroom if you can.    Stay away from others in your home. No hugging, kissing or shaking hands. No visitors.    Don't go to work, school or anywhere else.    Clean \"high touch\" surfaces often (doorknobs, counters, handles). Use household cleaning spray or wipes.    You'll find a full list of  on the EPA website: www.epa.gov/pesticide-registration/list-n-disinfectants-use-against-sars-cov-2.    Cover your mouth and nose with a mask or other face covering to avoid spreading germs.    Wash your hands and face often. Use soap and water.    Caregivers in these groups are at risk for severe illness due to COVID-19:  ? People 65 years and older  ? People who live in a nursing home or long-term care facility  ? People with chronic disease (lung, heart, cancer, diabetes, kidney, liver, immunologic)  ? People who have a weakened immune system, including those who:    Are in cancer treatment    Take medicine that weakens the " immune system, such as corticosteroids    Had a bone marrow or organ transplant    Have an immune deficiency    Have poorly controlled HIV or AIDS    Are obese (body mass index of 40 or higher)    Smoke regularly    Caregivers should wear gloves while washing dishes, handling laundry and cleaning bedrooms and bathrooms.    Use caution when washing and drying laundry: Don't shake dirty laundry and use the warmest water setting that you can.    For more tips on managing your health at home, go to www.cdc.gov/coronavirus/2019-ncov/downloads/10Things.pdf.  How can I take care of myself at home?  1. Get lots of rest. Drink extra fluids (unless a doctor has told you not to).  2. Take Tylenol (acetaminophen) for fever or pain. If you have liver or kidney problems, ask your family doctor if it's okay to take Tylenol.   Adults can take either:   ? 650 mg (two 325 mg pills) every 4 to 6 hours, or   ? 1,000 mg (two 500 mg pills) every 8 hours as needed.  ? Note: Don't take more than 3,000 mg in one day. Acetaminophen is found in many medicines (both prescribed and over-the-counter medicines). Read all labels to be sure you don't take too much.   For children, check the Tylenol bottle for the right dose. The dose is based on the child's age or weight.  3. If you have other health problems (like cancer, heart failure, an organ transplant or severe kidney disease): Call your specialty clinic if you don't feel better in the next 2 days.  4. Know when to call 911. Emergency warning signs include:  ? Trouble breathing or shortness of breath  ? Pain or pressure in the chest that doesn't go away  ? Feeling confused like you haven't felt before, or not being able to wake up  ? Bluish-colored lips or face  5. Your doctor may have prescribed a blood thinner medicine. Follow their instructions.  Where can I get more information?     [employer] [facility] - About COVID-19:   [email or web address]    CDC - What to Do If You're  Sick: www.cdc.gov/coronavirus/2019-ncov/about/steps-when-sick.html    CDC - Ending Home Isolation: www.cdc.gov/coronavirus/2019-ncov/hcp/disposition-in-home-patients.html    CDC - Caring for Someone: www.cdc.gov/coronavirus/2019-ncov/if-you-are-sick/care-for-someone.html    [facility] - Interim Guidance for Hospital Discharge to Home: www.[email or web address]./diseases/coronavirus/hcp/hospdischarge.pdf    Below are the COVID-19 hotlines at the [place] Department of Health ([facility]). Interpreters are available.  ? For health questions: Call 211-428-8470 or 1-868.441.8482 (7 a.m. to 7 p.m.)  ? For questions about schools and childcare: Call 108-028-1307 or 1-244.536.7911 (7 a.m. to 7 p.m.)    For informational purposes only. Not to replace the advice of your health care provider. Clinically reviewed by Dr. Baltazar Gutierrez.   Copyright   2020 [employer]. All rights reserved. [employer] [id number] - REV 01/05/21.

## 2022-01-01 ENCOUNTER — HEALTH MAINTENANCE LETTER (OUTPATIENT)
Age: 47
End: 2022-01-01

## 2022-01-11 ENCOUNTER — IMMUNIZATION (OUTPATIENT)
Dept: NURSING | Facility: CLINIC | Age: 47
End: 2022-01-11
Payer: COMMERCIAL

## 2022-01-11 PROCEDURE — 91300 PR COVID VAC PFIZER DIL RECON 30 MCG/0.3 ML IM: CPT

## 2022-01-11 PROCEDURE — 0004A PR COVID VAC PFIZER DIL RECON 30 MCG/0.3 ML IM: CPT

## 2022-03-28 ENCOUNTER — ANCILLARY PROCEDURE (OUTPATIENT)
Dept: MAMMOGRAPHY | Facility: CLINIC | Age: 47
End: 2022-03-28
Attending: PHYSICIAN ASSISTANT
Payer: COMMERCIAL

## 2022-03-28 DIAGNOSIS — Z12.31 VISIT FOR SCREENING MAMMOGRAM: ICD-10-CM

## 2022-03-28 PROCEDURE — 77067 SCR MAMMO BI INCL CAD: CPT | Mod: TC | Performed by: RADIOLOGY

## 2022-04-07 ENCOUNTER — E-VISIT (OUTPATIENT)
Dept: FAMILY MEDICINE | Facility: CLINIC | Age: 47
End: 2022-04-07
Payer: COMMERCIAL

## 2022-04-07 DIAGNOSIS — H60.391 INFECTIVE OTITIS EXTERNA, RIGHT: Primary | ICD-10-CM

## 2022-04-07 PROCEDURE — 99421 OL DIG E/M SVC 5-10 MIN: CPT | Performed by: NURSE PRACTITIONER

## 2022-04-07 RX ORDER — CIPROFLOXACIN AND DEXAMETHASONE 3; 1 MG/ML; MG/ML
4 SUSPENSION/ DROPS AURICULAR (OTIC) 2 TIMES DAILY
Qty: 7.5 ML | Refills: 0 | Status: SHIPPED | OUTPATIENT
Start: 2022-04-07 | End: 2022-04-14

## 2022-04-07 NOTE — TELEPHONE ENCOUNTER
Provider E-Visit time total (minutes): 5    Spoke with pt. Clarified it is the EAC and not pinna.   RODRICK Allen CNP

## 2022-04-07 NOTE — PATIENT INSTRUCTIONS
Thank you for choosing us for your care. I have placed an order for a prescription so that you can start treatment. View your full visit summary for details by clicking on the link below. Your pharmacist will able to address any questions you may have about the medication.     If you're not feeling better within 5-7 days, please schedule an appointment.  You can schedule an appointment right here in [facility], or call [phone]  If the visit is for the same symptoms as your eVisit, we'll refund the cost of your eVisit if seen within seven days.

## 2022-04-25 ENCOUNTER — E-VISIT (OUTPATIENT)
Dept: URGENT CARE | Facility: URGENT CARE | Age: 47
End: 2022-04-25
Payer: COMMERCIAL

## 2022-04-25 DIAGNOSIS — L25.9 CONTACT DERMATITIS, UNSPECIFIED CONTACT DERMATITIS TYPE, UNSPECIFIED TRIGGER: Primary | ICD-10-CM

## 2022-04-25 PROCEDURE — 99421 OL DIG E/M SVC 5-10 MIN: CPT | Performed by: PHYSICIAN ASSISTANT

## 2022-04-25 RX ORDER — TRIAMCINOLONE ACETONIDE 1 MG/G
OINTMENT TOPICAL 2 TIMES DAILY
Qty: 80 G | Refills: 1 | Status: SHIPPED | OUTPATIENT
Start: 2022-04-25 | End: 2022-08-30

## 2022-04-26 NOTE — PATIENT INSTRUCTIONS
Dear Zara Roberts    I am not sure what is causing your rash which is not uncommon with rashes. I have prescribed a steroid cream which will help with itching and inflammation. If rash is not better in one week, please make appointment to be seen.     Thanks for choosing us as your health care partner,    Brittany Smith PA-C

## 2022-08-23 ENCOUNTER — HOSPITAL ENCOUNTER (EMERGENCY)
Facility: CLINIC | Age: 47
Discharge: HOME | End: 2022-08-23
Attending: PHYSICIAN ASSISTANT | Admitting: PHYSICIAN ASSISTANT
Payer: COMMERCIAL

## 2022-08-23 ENCOUNTER — APPOINTMENT (OUTPATIENT)
Dept: GENERAL RADIOLOGY | Facility: CLINIC | Age: 47
End: 2022-08-23
Attending: PHYSICIAN ASSISTANT
Payer: COMMERCIAL

## 2022-08-23 VITALS
BODY MASS INDEX: 22.96 KG/M2 | SYSTOLIC BLOOD PRESSURE: 127 MMHG | HEART RATE: 68 BPM | TEMPERATURE: 98.4 F | WEIGHT: 155 LBS | HEIGHT: 69 IN | OXYGEN SATURATION: 100 % | DIASTOLIC BLOOD PRESSURE: 86 MMHG | RESPIRATION RATE: 16 BRPM

## 2022-08-23 DIAGNOSIS — M54.2 NECK PAIN: ICD-10-CM

## 2022-08-23 DIAGNOSIS — R07.9 CHEST PAIN, UNSPECIFIED TYPE: ICD-10-CM

## 2022-08-23 LAB
ALBUMIN SERPL-MCNC: 3.9 G/DL (ref 3.4–5)
ALP SERPL-CCNC: 54 U/L (ref 40–150)
ALT SERPL W P-5'-P-CCNC: 29 U/L (ref 0–50)
ANION GAP SERPL CALCULATED.3IONS-SCNC: 4 MMOL/L (ref 3–14)
AST SERPL W P-5'-P-CCNC: 21 U/L (ref 0–45)
ATRIAL RATE - MUSE: 78 BPM
BASOPHILS # BLD AUTO: 0.1 10E3/UL (ref 0–0.2)
BASOPHILS NFR BLD AUTO: 1 %
BILIRUB SERPL-MCNC: 0.4 MG/DL (ref 0.2–1.3)
BUN SERPL-MCNC: 11 MG/DL (ref 7–30)
CALCIUM SERPL-MCNC: 9.2 MG/DL (ref 8.5–10.1)
CHLORIDE BLD-SCNC: 107 MMOL/L (ref 94–109)
CO2 SERPL-SCNC: 27 MMOL/L (ref 20–32)
CREAT SERPL-MCNC: 0.67 MG/DL (ref 0.52–1.04)
DIASTOLIC BLOOD PRESSURE - MUSE: NORMAL MMHG
EOSINOPHIL # BLD AUTO: 0.4 10E3/UL (ref 0–0.7)
EOSINOPHIL NFR BLD AUTO: 7 %
ERYTHROCYTE [DISTWIDTH] IN BLOOD BY AUTOMATED COUNT: 12.5 % (ref 10–15)
GFR SERPL CREATININE-BSD FRML MDRD: >90 ML/MIN/1.73M2
GLUCOSE BLD-MCNC: 96 MG/DL (ref 70–99)
HCG SERPL QL: NEGATIVE
HCT VFR BLD AUTO: 36.4 % (ref 35–47)
HGB BLD-MCNC: 12.3 G/DL (ref 11.7–15.7)
HOLD SPECIMEN: NORMAL
IMM GRANULOCYTES # BLD: 0 10E3/UL
IMM GRANULOCYTES NFR BLD: 1 %
INTERPRETATION ECG - MUSE: NORMAL
LYMPHOCYTES # BLD AUTO: 1.6 10E3/UL (ref 0.8–5.3)
LYMPHOCYTES NFR BLD AUTO: 27 %
MCH RBC QN AUTO: 31.1 PG (ref 26.5–33)
MCHC RBC AUTO-ENTMCNC: 33.8 G/DL (ref 31.5–36.5)
MCV RBC AUTO: 92 FL (ref 78–100)
MONOCYTES # BLD AUTO: 0.5 10E3/UL (ref 0–1.3)
MONOCYTES NFR BLD AUTO: 9 %
NEUTROPHILS # BLD AUTO: 3.2 10E3/UL (ref 1.6–8.3)
NEUTROPHILS NFR BLD AUTO: 55 %
NRBC # BLD AUTO: 0 10E3/UL
NRBC BLD AUTO-RTO: 0 /100
P AXIS - MUSE: 63 DEGREES
PLATELET # BLD AUTO: 192 10E3/UL (ref 150–450)
POTASSIUM BLD-SCNC: 3.5 MMOL/L (ref 3.4–5.3)
PR INTERVAL - MUSE: 116 MS
PROT SERPL-MCNC: 7.2 G/DL (ref 6.8–8.8)
QRS DURATION - MUSE: 84 MS
QT - MUSE: 380 MS
QTC - MUSE: 433 MS
R AXIS - MUSE: 87 DEGREES
RBC # BLD AUTO: 3.96 10E6/UL (ref 3.8–5.2)
SODIUM SERPL-SCNC: 138 MMOL/L (ref 133–144)
SYSTOLIC BLOOD PRESSURE - MUSE: NORMAL MMHG
T AXIS - MUSE: 61 DEGREES
TROPONIN I SERPL HS-MCNC: <3 NG/L
VENTRICULAR RATE- MUSE: 78 BPM
WBC # BLD AUTO: 5.8 10E3/UL (ref 4–11)

## 2022-08-23 PROCEDURE — 93005 ELECTROCARDIOGRAM TRACING: CPT

## 2022-08-23 PROCEDURE — 84484 ASSAY OF TROPONIN QUANT: CPT | Performed by: PHYSICIAN ASSISTANT

## 2022-08-23 PROCEDURE — 84703 CHORIONIC GONADOTROPIN ASSAY: CPT | Performed by: PHYSICIAN ASSISTANT

## 2022-08-23 PROCEDURE — 85025 COMPLETE CBC W/AUTO DIFF WBC: CPT | Performed by: PHYSICIAN ASSISTANT

## 2022-08-23 PROCEDURE — 96374 THER/PROPH/DIAG INJ IV PUSH: CPT

## 2022-08-23 PROCEDURE — 71046 X-RAY EXAM CHEST 2 VIEWS: CPT

## 2022-08-23 PROCEDURE — 84484 ASSAY OF TROPONIN QUANT: CPT | Performed by: EMERGENCY MEDICINE

## 2022-08-23 PROCEDURE — 99285 EMERGENCY DEPT VISIT HI MDM: CPT | Mod: 25

## 2022-08-23 PROCEDURE — 84703 CHORIONIC GONADOTROPIN ASSAY: CPT | Performed by: EMERGENCY MEDICINE

## 2022-08-23 PROCEDURE — 80053 COMPREHEN METABOLIC PANEL: CPT | Performed by: EMERGENCY MEDICINE

## 2022-08-23 PROCEDURE — 36415 COLL VENOUS BLD VENIPUNCTURE: CPT | Performed by: EMERGENCY MEDICINE

## 2022-08-23 PROCEDURE — 80053 COMPREHEN METABOLIC PANEL: CPT | Performed by: PHYSICIAN ASSISTANT

## 2022-08-23 PROCEDURE — 250N000011 HC RX IP 250 OP 636: Performed by: EMERGENCY MEDICINE

## 2022-08-23 PROCEDURE — 85025 COMPLETE CBC W/AUTO DIFF WBC: CPT | Performed by: EMERGENCY MEDICINE

## 2022-08-23 RX ORDER — KETOROLAC TROMETHAMINE 15 MG/ML
30 INJECTION, SOLUTION INTRAMUSCULAR; INTRAVENOUS ONCE
Status: COMPLETED | OUTPATIENT
Start: 2022-08-23 | End: 2022-08-23

## 2022-08-23 RX ADMIN — KETOROLAC TROMETHAMINE 30 MG: 15 INJECTION, SOLUTION INTRAMUSCULAR; INTRAVENOUS at 13:00

## 2022-08-23 ASSESSMENT — ENCOUNTER SYMPTOMS
WEAKNESS: 0
NUMBNESS: 0
SORE THROAT: 0
BLOOD IN STOOL: 0
HEADACHES: 0
COUGH: 0
SHORTNESS OF BREATH: 0
DIZZINESS: 0
FEVER: 0
VOMITING: 0
ABDOMINAL PAIN: 0
LIGHT-HEADEDNESS: 0
DIARRHEA: 0
NECK PAIN: 1

## 2022-08-23 NOTE — ED NOTES
Rapid Assessment Note    History:   Zara Roberts is a 47 year old female who presents with mild chest pain for the past couple of days that she states was not going to bring her in, she has an appointment tomorrow to get that looked at.  Not short of breath.  No history of blood clots and no travel recently.  Says that she is having shooting pain through the neck and shoulders starting this morning and hurts worse with moving her neck around. She says that she has not done anything strenuous and doesn't think that she slept on it wrong. She also complains of tightness and swelling of her feet. She took ibuprofen this morning with minimal relief.     Exam:   Constitutional: Patient appears comfortable.  Vital signs reviewed and are normal.    HENT: Voice normal    Respiratory:  No respiratory distress, able to speak in full sentences.  Respiratory rate appears normal.    Neurologic: Alert and interacting normally.  Oriented, no obvious focal weakness, gait is normal.    Psych: Mentation is normal, thought processes normal.      Plan of Care:   I evaluated the patient and developed an initial plan of care. I discussed this plan and explained that I, or one of my partners, would be returning to complete the evaluation.     I, Johny Smith, am serving as a scribe to document services personally performed by Guerita Leach MD, based on my observations and the provider's statements to me.       Guerita Leach MD  08/23/22 1801

## 2022-08-23 NOTE — ED TRIAGE NOTES
C/o swelling and mild upper chest pain      Triage Assessment     Row Name 08/23/22 8039       Triage Assessment (Adult)    Airway WDL WDL       Respiratory WDL    Respiratory WDL WDL       Skin Circulation/Temperature WDL    Skin Circulation/Temperature WDL WDL       Cardiac WDL    Cardiac WDL WDL       Chest Pain Assessment    Chest Pain Location midsternal       Peripheral/Neurovascular WDL    Peripheral Neurovascular WDL X       Cognitive/Neuro/Behavioral WDL    Cognitive/Neuro/Behavioral WDL WDL

## 2022-08-23 NOTE — ED PROVIDER NOTES
"  History     Chief Complaint:  Chest Pain     Pleasant 48 y/o female presents for evaluation of intermittent CP that she has experienced for the past few weeks.  Mild pain in center of chest that can last hours. Nothing in particular provokes or relieves symptoms.  Not associated with feeling dizzy or faint, no radiation, and no N/V or feeling sweaty.  Can sometimes feel mild dyspnea with symptoms.  Can feel anxious.  Has been feeling \"down\" lately, denies SI/HI.  Was thinking symptoms may be related to mental health but woke up with neck soreness and mild jaw pain this morning with chest symptoms and friend encouraged her to be seen.  She incidentally had virtual appointment with PCP clinic scheduled for this afternoon to discuss her symptoms.     Denies HA  No vision change  No recent fevers  No cough, ST  No N/T/W to extremities  Neck pain feels sore to each side, pain worse with movement of her neck  No abd pain  No N/V/D  Ankles felt swollen this AM, resolved  No leg/calf swelling/pain    No Hx of HTN, DM, HLD  Not on BCP/hormones  Not on immunocompromising medications    FHx: Father HTN.  No early CAD or sudden death. No clotting disorders    Non-smoker  No recent travel  PCP established  Local resident      The history is provided by the patient and medical records. No  was used.      ROS:  Review of Systems   Constitutional: Negative for fever.   HENT: Negative for sore throat.    Eyes: Negative for visual disturbance.   Respiratory: Negative for cough and shortness of breath.    Cardiovascular: Positive for chest pain.   Gastrointestinal: Negative for abdominal pain, blood in stool, diarrhea and vomiting.   Musculoskeletal: Positive for neck pain.   Neurological: Negative for dizziness, syncope, weakness, light-headedness, numbness and headaches.   All other systems reviewed and are negative.    Allergies:  No Known Drug Allergy     Medications:    Multiple Vitamins-Minerals " (MULTIVITAMIN ADULT PO)  triamcinolone (KENALOG) 0.1 % external ointment      Past Medical History:    Past Medical History:   Diagnosis Date     Iron deficiency anemia 2014     Seasonal allergies      Past Surgical History:    Past Surgical History:   Procedure Laterality Date     PHOTOREFRACTIVE KERATECTOMY UNILATERAL STANDARD OR CUSTOMVUE W OR W/O MITOMYCIN Right 2016    Procedure: PHOTOREFRACTIVE KERATECTOMY UNILATERAL STANDARD OR CUSTOMVUE W OR W/O MITOMYCIN;  Surgeon: Rene Pena MD;  Location: [facility]     PHOTOREFRACTIVE KERATECTOMY UNILATERAL STANDARD OR CUSTOMVUE W OR W/O MITOMYCIN Left 2016    Procedure: PHOTOREFRACTIVE KERATECTOMY UNILATERAL STANDARD OR CUSTOMVUE W OR W/O MITOMYCIN;  Surgeon: Rene Pena MD;  Location: [facility]     TUBAL LIGATION  2008     WAVESCAN SCREENING Bilateral 2016    Procedure: WAVESCAN SCREENING;  Surgeon: Rene Pena MD;  Location: [facility]     ZZC C-SEC+POSTPARTUM CARE,PREV C-SEC  2008    Twins -       Family History:    family history includes Arthritis in her mother; Asthma in her son; Colon Cancer (age of onset: 68) in her paternal aunt; Colon Cancer (age of onset: 70) in her paternal uncle; Colon Cancer (age of onset: 75) in her paternal grandfather; Colon Polyps in her father; Connective Tissue Disorder in her brother; Depression in her brother; Family History Negative in her mother, son, son, and son; Glaucoma in her paternal grandfather, paternal grandmother, and another family member; Hyperlipidemia in her father; Hypertension in her father; Lipids in her father; Other Cancer in her brother; Rheumatoid Arthritis in her maternal grandmother.    Social History:   reports that she quit smoking about 9 years ago. Her smoking use included cigarettes. She has a 2.50 pack-year smoking history. She has never used smokeless tobacco. She reports current alcohol use. She reports that she does not use drugs.  PCP: Debra Crum  "Ce     Physical Exam     Patient Vitals for the past 24 hrs:   BP Temp Temp src Pulse Resp SpO2 Height Weight   08/23/22 1246 (!) 147/86 98.4  F (36.9  C) Temporal 70 16 99 % 1.753 m (5' 9\") 70.3 kg (155 lb)      Physical Exam  Vitals and nursing note reviewed.   Constitutional:       General: She is not in acute distress.     Appearance: Normal appearance. She is not ill-appearing, toxic-appearing or diaphoretic.   HENT:      Head: Normocephalic.      Right Ear: External ear normal.      Left Ear: External ear normal.      Mouth/Throat:      Mouth: Mucous membranes are moist.      Pharynx: Oropharynx is clear. No oropharyngeal exudate or posterior oropharyngeal erythema.      Comments: No pharyngitis  Clear speech  No trismus    Eyes:      General:         Right eye: No discharge.         Left eye: No discharge.      Conjunctiva/sclera: Conjunctivae normal.   Neck:      Comments: Mild TTP to bilateral neck soft tissues/trap areas. No posterior midline TTP or pain to posterior midline with ROM of neck. Ranging neck with ease.   Cardiovascular:      Rate and Rhythm: Normal rate and regular rhythm.      Pulses: Normal pulses.      Heart sounds: Normal heart sounds.   Pulmonary:      Effort: Pulmonary effort is normal. No respiratory distress.      Breath sounds: Normal breath sounds.   Abdominal:      Palpations: Abdomen is soft.      Tenderness: There is no abdominal tenderness.   Musculoskeletal:         General: No swelling or tenderness. Normal range of motion.      Cervical back: Normal range of motion and neck supple. No rigidity.      Right lower leg: No edema.      Left lower leg: No edema.   Skin:     General: Skin is warm.      Capillary Refill: Capillary refill takes less than 2 seconds.   Neurological:      General: No focal deficit present.      Mental Status: She is alert.      Sensory: No sensory deficit.      Motor: No weakness.      Comments: Face symmetric  Clear speech  Equal  and " DF/PF  SARGENT  Sensation grossly intact to light touch to extremities.    Psychiatric:         Mood and Affect: Mood normal.         Behavior: Behavior normal.         Thought Content: Thought content normal.         Judgment: Judgment normal.       Emergency Department Course   ECG:  ECG results from 08/23/22   EKG 12 lead     Value    Systolic Blood Pressure     Diastolic Blood Pressure     Ventricular Rate 78    Atrial Rate 78    FL Interval 116    QRS Duration 84        QTc 433    P Axis 63    R AXIS 87    T Axis 61    Interpretation ECG      Sinus rhythm  Normal ECG  No previous ECGs available  Confirmed by GENERATED REPORT, COMPUTER ([id number]),  Sheri Lee ([id number]) on 8/23/2022 3:27:32 PM       Imaging:  XR Chest 2 Views   Final Result   IMPRESSION: Negative chest. Lungs are clear. No pleural effusion or pneumothorax. Slight pectus excavatum deformity.               Report per radiology    Laboratory:  Labs Ordered and Resulted from Time of ED Arrival to Time of ED Departure   TROPONIN I - Normal       Result Value    Troponin I High Sensitivity <3     COMPREHENSIVE METABOLIC PANEL - Normal    Sodium 138      Potassium 3.5      Chloride 107      Carbon Dioxide (CO2) 27      Anion Gap 4      Urea Nitrogen 11      Creatinine 0.67      Calcium 9.2      Glucose 96      Alkaline Phosphatase 54      AST 21      ALT 29      Protein Total 7.2      Albumin 3.9      Bilirubin Total 0.4      GFR Estimate >90     HCG QUALITATIVE PREGNANCY - Normal    hCG Serum Qualitative Negative     CBC WITH PLATELETS AND DIFFERENTIAL    WBC Count 5.8      RBC Count 3.96      Hemoglobin 12.3      Hematocrit 36.4      MCV 92      MCH 31.1      MCHC 33.8      RDW 12.5      Platelet Count 192      % Neutrophils 55      % Lymphocytes 27      % Monocytes 9      % Eosinophils 7      % Basophils 1      % Immature Granulocytes 1      NRBCs per 100 WBC 0      Absolute Neutrophils 3.2      Absolute Lymphocytes 1.6      Absolute Monocytes 0.5    "   Absolute Eosinophils 0.4      Absolute Basophils 0.1      Absolute Immature Granulocytes 0.0      Absolute NRBCs 0.0       Emergency Department Course:     Reviewed:  I reviewed nursing notes, vitals and past medical history    Assessments:  1600: I obtained history and examined the patient as noted above.   1645: CXR noted.  Discussed results and dispo plan.     Interventions:  Medications   ketorolac (TORADOL) injection 30 mg (30 mg Intravenous Given 8/23/22 1300)      Disposition:  The patient was discharged to home.     Impression & Plan      Medical Decision Making:  Pleasant 46 y/o female presents for evaluation of intermittent CP that she has experienced for the past few weeks.  Mild pain in center of chest that can last hours. Nothing in particular provokes or relieves symptoms.  Not associated with feeling dizzy or faint, no radiation, and no N/V or feeling sweaty.  Can sometimes feel mild dyspnea with symptoms.  Can feel anxious.  Has been feeling \"down\" lately, denies SI/HI.  Was thinking symptoms may be related to mental health but woke up with neck soreness and mild jaw pain this morning with chest symptoms and friend encouraged her to be seen.  She incidentally had virtual appointment with PCP clinic scheduled for this afternoon to discuss her symptoms.     Exam as above.  PIT note and labs/EKG reviewed and D/W pt reassuring with no leukocytosis/penia, anemia, abnl platelets and has normal renal/liver function and trop neg and EKG without acute ischemic changes.  With CP symptoms onset earlier this morning and <3 trop, does not require delta.  Heart score:1, stable for outpt F/U. Will check CXR for reassurance against cardiomegaly, mass, etc however.  Discussed neck pain is suspected to be strain/MSK etiology, possibly related to stress/tension.  Not suspected this is central cord syndrome, NRI, spinal infectious etiology or cervical vascular etiology and I do not suspect her CP and neck pain is due " to aortic dissection at this time.  Not suspected this is PE picture (PERC neg).  We discussed her mental health, no SI/HI and not suspected to be immediate threat to herself or others.  Offered EMPATH eval, she declines and prefers to F/U with her established PCP.  She is happy with plan.     Update: CXR unremarkable.  Pt remains well appearing on re-eval with no new complaints.  Discussed she is felt stable for discharge.  Discussed again neck pain is suspected to be strain/MSK etiology at this time and recommend motrin/tylenol, can try lidoderm patches etc.  She declines muscle relaxer Rx.  Discussed in regards to chest pain symptoms, no clear definitive Dx but felt stable for discharge as again reassuring trop/EKG, CXR reassuring, not suspected this is biliary obstruction and not suspected this is PE or dissection.  She will F/U with her established PCP within the next week.  We also discussed she should return here in regards to her mental health if she develops thoughts of hurting herself or others. Pt educated on S/S that should prompt ED re-eval.  Questions answered. Verbalized understanding. Comfortable with plan and appreciative.     Diagnosis:    ICD-10-CM    1. Chest pain, unspecified type  R07.9    2. Neck pain  M54.2       Discharge Medications:  New Prescriptions    No medications on file      8/23/2022   Jovita Núñez PA-C Med[place], Leah M, PA-C  08/23/22 3079

## 2022-08-23 NOTE — ED NOTES
RN Across the Room Assessment in Triage      Means of arrival: Personal vehicle    Airway: Alert in the AVPU scale    Breathing: Spontaneous breathing    Circulation: No external bleeding    Disability: Ambulatory      Across the Room Intervention: In line for triage       Pt reports chest pain for over one week. Pt woke with upper back pain. Referred in from PCP

## 2022-08-29 ENCOUNTER — TRANSFERRED RECORDS (OUTPATIENT)
Dept: MULTI SPECIALTY CLINIC | Facility: CLINIC | Age: 47
End: 2022-08-29

## 2022-08-29 LAB — PAP SMEAR - HIM PATIENT REPORTED: NORMAL

## 2022-08-30 ENCOUNTER — OFFICE VISIT (OUTPATIENT)
Dept: FAMILY MEDICINE | Facility: CLINIC | Age: 47
End: 2022-08-30
Payer: COMMERCIAL

## 2022-08-30 VITALS
HEIGHT: 69 IN | SYSTOLIC BLOOD PRESSURE: 113 MMHG | WEIGHT: 158 LBS | OXYGEN SATURATION: 96 % | DIASTOLIC BLOOD PRESSURE: 75 MMHG | BODY MASS INDEX: 23.4 KG/M2 | HEART RATE: 71 BPM

## 2022-08-30 DIAGNOSIS — Z12.11 SCREEN FOR COLON CANCER: ICD-10-CM

## 2022-08-30 DIAGNOSIS — N94.6 DYSMENORRHEA: ICD-10-CM

## 2022-08-30 DIAGNOSIS — Z09 HOSPITAL DISCHARGE FOLLOW-UP: ICD-10-CM

## 2022-08-30 DIAGNOSIS — M54.2 NECK PAIN: ICD-10-CM

## 2022-08-30 DIAGNOSIS — F41.9 ANXIETY: ICD-10-CM

## 2022-08-30 PROCEDURE — 99215 OFFICE O/P EST HI 40 MIN: CPT | Performed by: PHYSICIAN ASSISTANT

## 2022-08-30 RX ORDER — MULTIVITAMIN WITH IRON
TABLET ORAL
COMMUNITY
Start: 2020-01-01

## 2022-08-30 RX ORDER — SERTRALINE HYDROCHLORIDE 25 MG/1
25 TABLET, FILM COATED ORAL DAILY
Qty: 14 TABLET | Refills: 0 | Status: SHIPPED | OUTPATIENT
Start: 2022-08-30 | End: 2023-01-12

## 2022-08-30 ASSESSMENT — PAIN SCALES - GENERAL: PAINLEVEL: MILD PAIN (2)

## 2022-08-30 NOTE — PATIENT INSTRUCTIONS
Women's health referral    Colonoscopy    Start zoloft 25mg daily for 2 weeks then increase to 50mg daily    Follow-up/establish with a primary provider in 6-8 weeks
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
## 2022-08-31 ENCOUNTER — HOSPITAL ENCOUNTER (OUTPATIENT)
Facility: CLINIC | Age: 47
End: 2022-08-31
Attending: SPECIALIST | Admitting: SPECIALIST
Payer: COMMERCIAL

## 2022-08-31 ENCOUNTER — TELEPHONE (OUTPATIENT)
Dept: GASTROENTEROLOGY | Facility: CLINIC | Age: 47
End: 2022-08-31

## 2022-08-31 DIAGNOSIS — Z12.11 ENCOUNTER FOR SCREENING COLONOSCOPY: Primary | ICD-10-CM

## 2022-08-31 NOTE — TELEPHONE ENCOUNTER
Screening Questions    BlueKIND OF PREP RedLOCATION [review exclusion criteria] GreenSEDATION TYPE      1. Are you active on mychart? y    2. What insurance is in the chart? [facility]     3.   Ordering/Referring Provider: Eufemia Henry, Evelyn Jules PA-C    4. BMI   (If greater than 40 review exclusion criteria [PAC APPT IF [MAC] @ [facility])  23.40  [If yes, BMI OVER 40-EXTENDED PREP]      **(Sedation review/consideration needed)**  Do you have a legal guardian or Medical Power of    and/or are you able to give consent for your medical care?     Y SELF    5. Have you had a positive covid test in the last 90 days?   N -     6.  Are you currently on dialysis?   N [ If yes, G-PREP & HOSPITAL setting ONLY]     7.  Do you have chronic kidney disease?  N [ If yes, G-PREP ]    8.   Do you have a diagnosis of diabetes?   N   [ If yes, G-PREP ]    9.  On a regular basis do you go 3-5 days between bowel movements?   N   [ If yes, EXTENDED PREP]    10.  Are you taking any prescription pain medications on a routine schedule?    N -  [ If yes, EXTENDED PREP] [If yes, MAC]      11.   Do you have any chemical dependencies such as alcohol, street drugs, or methadone?    N [If yes, MAC]    12.   Do you have any history of post-traumatic stress syndrome, severe anxiety or history of psychosis?    N  [If yes, MAC]    13.  [FEMALES] Are you currently pregnant?     If yes, how many weeks?       Respiratory/Heart Screening:  [If yes to any of the following HOSPITAL setting only]     14. Do you have Pulmonary Hypertension [Lungs]?   N       15. Do you have UNCONTROLLED asthma?   N     16.  Do you use daily home oxygen?  N      17. Do you have mod to severe Obstructive Sleep Apnea?         (OKAY @  [facility]  SH  PH  RI  MG - if pt is not on OXYGEN)  N      18.   Have you had a heart or lung transplant?   N      19.   Have you had a stroke or Transient ischemic attack (TIA - aka  mini stroke ) within 6 months?  (If yes, please review  exclusion criteria)  N     20.   In the past 6 months, have you had any heart related issues including cardiomyopathy or heart attack?   N           If yes, did it require cardiac stenting or other implantable device?   N      21.   Do you have any implantable devices in your body (pacemaker, defib, LVAD)? (If yes, please review exclusion criteria)  N   22.  Do you take the medication Phentermine?  NO        23. Do you take nitroglycerin?   N           If yes, how often?   (if yes, HOSPITAL setting ONLY)    24.  Are you currently taking any blood thinners?    [If yes, INFORM patient to follw up w/ ORDERING PROVIDER FOR BRIDGING INSTRUCTIONS]     N    25.   Do you transfer independently?                (If NO, please HOSPITAL setting ONLY)  Y    26.   Preferred LOCAL Pharmacy for Pre Prescription:         [id number] [address] AT [address] [place] [address] & [facility]      Scheduling Details  (Please ask for phone number if not scheduled by patient)      Caller : Sarah  Date of Procedure: 10/12/22  Surgeon: Elif  Location:         Sedation Type: cs l   Conscious Sedation- Needs  for 6 hours after the procedure  MAC/General-Needs  for 24 hours after procedure    n :[Pre-op Required] at [facility]  MG and OR for MAC sedation   (advise patient they will need a pre-op WITH IN 30 DAYS of procedure date)     Type of Procedure Scheduled:   Lower Endoscopy [Colonoscopy]    Which Colonoscopy Prep was Sent?:   shira NAVARRETE CF PATIENTS & GROEN'S PATIENTS NEEDS EXTENDED PREP       Informed patient they will need an adult  y  Cannot take any type of public or medical transportation alone    Pre-Procedure Covid test to be completed at [facility] or Externally: at home test  **INFORMED OF HOME TESTING & LAB OPTION**        Confirmed Nurse will call to complete assessment y    Additional comments:

## 2022-10-07 ENCOUNTER — TELEPHONE (OUTPATIENT)
Dept: GASTROENTEROLOGY | Facility: CLINIC | Age: 47
End: 2022-10-07

## 2022-10-07 NOTE — TELEPHONE ENCOUNTER
Caller: Zara Roberts      Procedure: COLONOSCOPY    Date, Location, and Surgeon of Procedure Cancelled: 10/12, CATRACHITO INGRAM    Ordering Provider:Evelyn Torres PA-C    Reason for cancel (please be detailed, any staff messages or encounters to note?): CATRACHITO NOT AVAILABLE IN AM.      Rescheduled: NO, BLOCK AVAILABLE IN PM ROOM 04 SAME DAY. LVM FOR PT TO CALL BACK FOR RESCHEDULING.

## 2022-10-10 ENCOUNTER — TELEPHONE (OUTPATIENT)
Dept: GASTROENTEROLOGY | Facility: CLINIC | Age: 47
End: 2022-10-10

## 2022-10-10 NOTE — TELEPHONE ENCOUNTER
Caller: Zara Roberts      Procedure: COLONOSCOPY    Date, Location, and Surgeon of Procedure Cancelled: 10/12, CATRACHITO INGRAM    Ordering Provider:Evelyn Torres PA-C      Reason for cancel (please be detailed, any staff messages or encounters to note?): PATIENT RETURNED CALL FOR RESCCATRACHITO MO OUT.        Rescheduled: Y     If rescheduled:    Date: 12/30   Location:    Prep Resent: RESENT (changes to prep?)   Covid Test Rescheduled: HOME TEST   Note any change or update to original order/sedation: N/A

## 2022-10-30 ENCOUNTER — HEALTH MAINTENANCE LETTER (OUTPATIENT)
Age: 47
End: 2022-10-30

## 2022-11-07 ENCOUNTER — MYC MEDICAL ADVICE (OUTPATIENT)
Dept: FAMILY MEDICINE | Facility: CLINIC | Age: 47
End: 2022-11-07

## 2022-11-14 NOTE — TELEPHONE ENCOUNTER
Biometric form printed off and sent for scanning.    Bradly Barrera CMA on 11/14/2022 at 10:05 AM

## 2022-12-20 RX ORDER — BISACODYL 5 MG
TABLET, DELAYED RELEASE (ENTERIC COATED) ORAL
Qty: 4 TABLET | Refills: 0 | Status: SHIPPED | OUTPATIENT
Start: 2022-12-20 | End: 2023-02-23

## 2022-12-30 ENCOUNTER — HOSPITAL ENCOUNTER (OUTPATIENT)
Facility: CLINIC | Age: 47
Discharge: HOME | End: 2022-12-30
Attending: INTERNAL MEDICINE | Admitting: INTERNAL MEDICINE
Payer: COMMERCIAL

## 2022-12-30 VITALS
HEART RATE: 68 BPM | HEIGHT: 70 IN | SYSTOLIC BLOOD PRESSURE: 114 MMHG | RESPIRATION RATE: 13 BRPM | BODY MASS INDEX: 21.47 KG/M2 | WEIGHT: 150 LBS | OXYGEN SATURATION: 100 % | DIASTOLIC BLOOD PRESSURE: 80 MMHG

## 2022-12-30 DIAGNOSIS — Z12.11 ENCOUNTER FOR SCREENING COLONOSCOPY: Primary | ICD-10-CM

## 2022-12-30 LAB — COLONOSCOPY: NORMAL

## 2022-12-30 PROCEDURE — 258N000003 HC RX IP 258 OP 636: Performed by: INTERNAL MEDICINE

## 2022-12-30 PROCEDURE — G0121 COLON CA SCRN NOT HI RSK IND: HCPCS | Performed by: INTERNAL MEDICINE

## 2022-12-30 PROCEDURE — 250N000011 HC RX IP 250 OP 636: Performed by: INTERNAL MEDICINE

## 2022-12-30 PROCEDURE — 45378 DIAGNOSTIC COLONOSCOPY: CPT | Performed by: INTERNAL MEDICINE

## 2022-12-30 PROCEDURE — G0500 MOD SEDAT ENDO SERVICE >5YRS: HCPCS | Performed by: INTERNAL MEDICINE

## 2022-12-30 RX ORDER — FENTANYL CITRATE 50 UG/ML
INJECTION, SOLUTION INTRAMUSCULAR; INTRAVENOUS PRN
Status: DISCONTINUED | OUTPATIENT
Start: 2022-12-30 | End: 2022-12-30 | Stop reason: HOSPADM

## 2022-12-30 RX ORDER — SODIUM CHLORIDE 9 MG/ML
INJECTION, SOLUTION INTRAVENOUS CONTINUOUS PRN
Status: DISCONTINUED | OUTPATIENT
Start: 2022-12-30 | End: 2022-12-30 | Stop reason: HOSPADM

## 2022-12-30 ASSESSMENT — ACTIVITIES OF DAILY LIVING (ADL): ADLS_ACUITY_SCORE: 35

## 2023-02-23 ENCOUNTER — OFFICE VISIT (OUTPATIENT)
Dept: FAMILY MEDICINE | Facility: CLINIC | Age: 48
End: 2023-02-23
Payer: COMMERCIAL

## 2023-02-23 VITALS
HEART RATE: 80 BPM | HEIGHT: 69 IN | SYSTOLIC BLOOD PRESSURE: 136 MMHG | WEIGHT: 158 LBS | BODY MASS INDEX: 23.4 KG/M2 | DIASTOLIC BLOOD PRESSURE: 82 MMHG

## 2023-02-23 DIAGNOSIS — F41.9 ANXIETY: ICD-10-CM

## 2023-02-23 DIAGNOSIS — Z00.00 ENCOUNTER FOR ANNUAL PHYSICAL EXAM: Primary | ICD-10-CM

## 2023-02-23 PROBLEM — U07.1 INFECTION DUE TO 2019 NOVEL CORONAVIRUS: Status: RESOLVED | Noted: 2021-12-20 | Resolved: 2023-02-23

## 2023-02-23 PROCEDURE — 99396 PREV VISIT EST AGE 40-64: CPT | Performed by: INTERNAL MEDICINE

## 2023-02-23 NOTE — PROGRESS NOTES
SUBJECTIVE:   CC: Reva is an 47 year old who presents for preventive health visit.     This is a very pleasant 47-year-old here for a physical.  She is new to me.  She is to see Debra in the clinic.    She is doing very well.  She was started on Zoloft a several months ago for anxiety and is working quite well without side effects.  She works out regularly.  She is up-to-date on a colon exam, mammogram, as well as Pap smear.  She is , works for [employer], has 3 sons, twins freshman in high school and her oldest a senior in high school.      Patient has been advised of split billing requirements and indicates understanding: No  HPI            Today's PHQ-2 Score:   PHQ-2 (  [employer]) 2023   Q1: Little interest or pleasure in doing things 0   Q2: Feeling down, depressed or hopeless 0   PHQ-2 Score 0   PHQ-2 Total Score (12-17 Years)- Positive if 3 or more points; Administer PHQ-A if positive -   Q1: Little interest or pleasure in doing things -   Q2: Feeling down, depressed or hopeless -   PHQ-2 Score -       Have you ever done Advance Care Planning? (For example, a Health Directive, POLST, or a discussion with a medical provider or your loved ones about your wishes): No, advance care planning information given to patient to review.  Patient declined advance care planning discussion at this time.    Social History     Tobacco Use     Smoking status: Former     Packs/day: 0.50     Years: 10.00     Pack years: 5.00     Types: Cigarettes     Start date: 1999     Quit date: 2013     Years since quittin.5     Smokeless tobacco: Never   Substance Use Topics     Alcohol use: Yes     Comment: 1-2 drinks per day     If you drink alcohol do you typically have >3 drinks per day or >7 drinks per week? Yes               Past Medical History:      Past Medical History:   Diagnosis Date     H/O colonoscopy 2022    nl     Iron deficiency anemia 2014     Seasonal allergies               Past Surgical History:      Past Surgical History:   Procedure Laterality Date     COLONOSCOPY N/A 2022    Procedure: COLONOSCOPY;  Surgeon: Cleo Lees MD;  Location:  GI     PHOTOREFRACTIVE KERATECTOMY UNILATERAL STANDARD OR CUSTOMVUE W OR W/O MITOMYCIN Right 2016    Procedure: PHOTOREFRACTIVE KERATECTOMY UNILATERAL STANDARD OR CUSTOMVUE W OR W/O MITOMYCIN;  Surgeon: Rene Pena MD;  Location:  EC     PHOTOREFRACTIVE KERATECTOMY UNILATERAL STANDARD OR CUSTOMVUE W OR W/O MITOMYCIN Left 2016    Procedure: PHOTOREFRACTIVE KERATECTOMY UNILATERAL STANDARD OR CUSTOMVUE W OR W/O MITOMYCIN;  Surgeon: Rene Pena MD;  Location:  EC     TUBAL LIGATION  2008     WAVESCAN SCREENING Bilateral 2016    Procedure: WAVESCAN SCREENING;  Surgeon: Rene Pena MD;  Location:  EC     ZZC C-SEC+POSTPARTUM CARE,PREV C-SEC  2008    Twins -              Social History:     Social History     Socioeconomic History     Marital status:      Spouse name: Daniel     Number of children: 3     Years of education: Not on file     Highest education level: Not on file   Occupational History     Occupation:  - IT     Employer: [employer]   Tobacco Use     Smoking status: Former     Packs/day: 0.50     Years: 10.00     Pack years: 5.00     Types: Cigarettes     Start date: 1999     Quit date: 2013     Years since quittin.5     Smokeless tobacco: Never   Substance and Sexual Activity     Alcohol use: Yes     Comment: 1-2 drinks per day     Drug use: No     Sexual activity: Yes     Partners: Male     Birth control/protection: Female Surgical   Other Topics Concern     Parent/sibling w/ CABG, MI or angioplasty before 65F 55M? No   Social History Narrative    , 3 sons, one in high school ad 13 yo twins,     FT (consulting)    She coaches basketball     Social Determinants of Health     Financial Resource Strain:  "Not on file   Food Insecurity: Not on file   Transportation Needs: Not on file   Physical Activity: Not on file   Stress: Not on file   Social Connections: Not on file   Intimate Partner Violence: Not on file   Housing Stability: Not on file             Family History:   reviewed         Allergies:     Allergies   Allergen Reactions     No Known Drug Allergy              Medications:     Current Outpatient Medications   Medication Sig Dispense Refill     sertraline (ZOLOFT) 50 MG tablet Take 1 tablet (50 mg) by mouth daily 90 tablet 3     Ferrous Sulfate (IRON SUPPLEMENT PO) \"blood builder:       magnesium 250 MG tablet                  Review of Systems:   The 10 point Review of Systems is negative other than noted in the HPI           Physical Exam:   Blood pressure 136/82, pulse 80, height 1.757 m (5' 9.17\"), weight 71.7 kg (158 lb), last menstrual period 02/03/2023, not currently breastfeeding.    Exam:  Constitutional: healthy appearing, alert and in no distress  Heent: Normocephalic. Head without obvious masses or lesions. PERRLDC, EOMI. Mouth exam within normal limits: tongue, mucous membranes, posterior pharynx all normal, no lesions or abnormalities seen.  Tm's and canals within normal limits bilaterally. Neck supple, no nuchal rigidity or masses. No supraclavicular, or cervical adenopathy. Thyroid symmetric, no masses.  Cardiovascular: Regular rate and rhythm, no murmer, rub or gallops.  JVP not elevated, no edema.  Carotids within normal limits bilaterally, no bruits.  Respiratory: Normal respiratory effort.  Lungs clear, normal flow, no wheezing or crackles.  Breasts: Normal bilaterally.  No masses or lesions.  Nipples within normal limits.  No axillary lesions or nodes.  My M.A. Was present during this part of the examination.  Gastrointestinal: Normal active bowel sounds.   Soft, not tender, no masses, guarding or rebound.  No hepatosplenomegaly.   Musculoskeletal: extremities normal, no gross " deformities noted.  Skin: no suspicious lesions or rashes   Neurologic: Mental status within normal limits.  Speech fluent.  No gross motor abnormalities and gait intact.  Psychiatric: mentation appears normal and affect normal.         Data:   Labs noted        Assessment:   1. Normal complete physical exam  2. Anxiety, controlled  3. hcm         Plan:   Up to date immunizations, colon, mammogram and pap  Exercise, diet  Call if problems  I rec no more than 7 to 10 drinks a week      Jarrell Narayan M.D.

## 2023-06-01 ENCOUNTER — HEALTH MAINTENANCE LETTER (OUTPATIENT)
Age: 48
End: 2023-06-01

## 2023-11-16 ENCOUNTER — IMMUNIZATION (OUTPATIENT)
Dept: FAMILY MEDICINE | Facility: CLINIC | Age: 48
End: 2023-11-16
Payer: COMMERCIAL

## 2023-11-16 DIAGNOSIS — Z23 NEED FOR PROPHYLACTIC VACCINATION AND INOCULATION AGAINST INFLUENZA: Primary | ICD-10-CM

## 2023-11-16 DIAGNOSIS — Z23 HIGH PRIORITY FOR 2019-NCOV VACCINE: ICD-10-CM

## 2023-11-16 PROCEDURE — 91320 SARSCV2 VAC 30MCG TRS-SUC IM: CPT

## 2023-11-16 PROCEDURE — 90686 IIV4 VACC NO PRSV 0.5 ML IM: CPT

## 2023-11-16 PROCEDURE — 90480 ADMN SARSCOV2 VAC 1/ONLY CMP: CPT

## 2023-11-16 PROCEDURE — 90471 IMMUNIZATION ADMIN: CPT

## 2023-12-28 ENCOUNTER — OFFICE VISIT (OUTPATIENT)
Dept: OBGYN | Facility: CLINIC | Age: 48
End: 2023-12-28
Payer: COMMERCIAL

## 2023-12-28 VITALS
WEIGHT: 169.5 LBS | DIASTOLIC BLOOD PRESSURE: 78 MMHG | BODY MASS INDEX: 24.91 KG/M2 | HEART RATE: 68 BPM | SYSTOLIC BLOOD PRESSURE: 110 MMHG | OXYGEN SATURATION: 99 %

## 2023-12-28 DIAGNOSIS — Z01.419 ENCOUNTER FOR GYNECOLOGICAL EXAMINATION WITHOUT ABNORMAL FINDING: Primary | ICD-10-CM

## 2023-12-28 PROCEDURE — 99203 OFFICE O/P NEW LOW 30 MIN: CPT

## 2023-12-28 NOTE — PROGRESS NOTES
CC: hot flashes, irregular periods, perimenopause  S: Reva is a 49 yo  perimenopausal individual here today to discuss increasing vms.   -periods have becoming increasingly irregular currently 5-6 mo amennorrhea  -longest period of amenorrhea was 9 mo appx 3 yrs ago  -periods otherwise q20 days, heavy gushing, shorter in duration, but more intense in sx  -tried ocps 20's-30's for menses regulation  -had PPTL after twins no menses control after  -experiencing sx of hot flashes, anxiety, night sweats, weight gain, intense periods, feelings of PMS  Feeling like these sx are causing interference with life and seeking solutions    O:/78   Pulse 68   Wt 76.9 kg (169 lb 8 oz)   LMP  (LMP Unknown)   SpO2 99%   BMI 24.91 kg/m    Past Medical History:   Diagnosis Date    Anxiety 2022    zoloft added    H/O colonoscopy 2022    nl    Iron deficiency anemia 2014    Seasonal allergies      Past Surgical History:   Procedure Laterality Date    COLONOSCOPY N/A 2022    Procedure: COLONOSCOPY;  Surgeon: Cleo Lees MD;  Location:  GI    PHOTOREFRACTIVE KERATECTOMY UNILATERAL STANDARD OR CUSTOMVUE W OR W/O MITOMYCIN Right 2016    Procedure: PHOTOREFRACTIVE KERATECTOMY UNILATERAL STANDARD OR CUSTOMVUE W OR W/O MITOMYCIN;  Surgeon: Rene Pena MD;  Location: [facility]    PHOTOREFRACTIVE KERATECTOMY UNILATERAL STANDARD OR CUSTOMVUE W OR W/O MITOMYCIN Left 2016    Procedure: PHOTOREFRACTIVE KERATECTOMY UNILATERAL STANDARD OR CUSTOMVUE W OR W/O MITOMYCIN;  Surgeon: Rene Pena MD;  Location: [facility]    TUBAL LIGATION  2008    WAVESCAN SCREENING Bilateral 2016    Procedure: WAVESCAN SCREENING;  Surgeon: Rene Pena MD;  Location: [facility]    ZZC C-SEC+POSTPARTUM CARE,PREV C-SEC  2008    Twins -      Current Outpatient Medications   Medication    Ferrous Sulfate (IRON SUPPLEMENT PO)    magnesium 250 MG tablet    sertraline (ZOLOFT) 50 MG tablet     No  current facility-administered medications for this visit.        Allergies   Allergen Reactions    No Known Drug Allergy      Alert pleasant female NAD  RRR  Normal bp and pulse  ROS+ for hot flashes, weight gain, mood disturbances, altered period length and volume    A:Reva is a 47 yo  perimenopausal individual here today to discuss increasing vms.   P:  -recc updated mammogram screening  -recc thyroid screening, and updated lipid panel, for general health considerations will complete at work through health screening  -denies migraines, htn, bleeding or clotting dx, no hx abn paps documented, does not use tobacco good candidate for estrogen  -colonoscopy screening utd strong paternal fam hx colorectal CA does not qualify for LR fecal testing  -discussed since still menstruating and having heavy irregular menses one goal of tx is to control menses, one option would be to use NICOL that will give hormones to reduce VMS but also will give cycle control, some people are able to have shorter, lighter more predictable periods, or no periods all together  -discussed the level of NICOL hormones are at a much higher rate than HRT levels of hormones  -discussed average age of menopause is 52 yo, diagnosis of menopause is made after 12 mo amenorrhea  -no predicable lab values that show timing of menopause  -discussed risks vs benefits of HRT and duration of use  -discussed other option would be to shut down menses with mirena IUD, FDA approved for hmb, that would also be progesterone component for intact uterus with HRT, then would recommend estrogen patch for VMS  -could consider just HRT estrogen and progesterone, however if still having menses it might contribute to irregular menses.   -goal for HRT lowest effective dose for shortest amount of time.  -can also consider non hormonal agents for VMS such as paxil or veozah  -Reva feels like an estrogen patch and mirena IUD sound most satisfactory to her goals at this  point  -discussed in office placement of mirena IUD, recc 600-800 mg of ibuprofen and 500-1000 mg acetaminophen 1 hr prior to apt, additionally can request 0.5 mg ativan, OR 5mg oxycodone take 30 mins prior to apt must have .    Rtc for IUD placement, and estrogen HRT patch  RODRICK Knight CNP

## 2024-01-24 ENCOUNTER — PATIENT OUTREACH (OUTPATIENT)
Dept: CARE COORDINATION | Facility: CLINIC | Age: 49
End: 2024-01-24
Payer: COMMERCIAL

## 2024-02-07 ENCOUNTER — PATIENT OUTREACH (OUTPATIENT)
Dept: CARE COORDINATION | Facility: CLINIC | Age: 49
End: 2024-02-07
Payer: COMMERCIAL

## 2024-02-27 ENCOUNTER — PATIENT OUTREACH (OUTPATIENT)
Dept: CARE COORDINATION | Facility: CLINIC | Age: 49
End: 2024-02-27
Payer: COMMERCIAL

## 2024-03-26 ENCOUNTER — PATIENT OUTREACH (OUTPATIENT)
Dept: CARE COORDINATION | Facility: CLINIC | Age: 49
End: 2024-03-26
Payer: COMMERCIAL

## 2024-03-31 ENCOUNTER — HEALTH MAINTENANCE LETTER (OUTPATIENT)
Age: 49
End: 2024-03-31

## 2024-05-07 ENCOUNTER — OFFICE VISIT (OUTPATIENT)
Dept: FAMILY MEDICINE | Facility: CLINIC | Age: 49
End: 2024-05-07
Payer: COMMERCIAL

## 2024-05-07 VITALS
SYSTOLIC BLOOD PRESSURE: 136 MMHG | RESPIRATION RATE: 16 BRPM | DIASTOLIC BLOOD PRESSURE: 86 MMHG | HEART RATE: 64 BPM | WEIGHT: 168 LBS | OXYGEN SATURATION: 100 % | HEIGHT: 69 IN | BODY MASS INDEX: 24.88 KG/M2 | TEMPERATURE: 96.9 F

## 2024-05-07 DIAGNOSIS — J06.9 VIRAL URI WITH COUGH: Primary | ICD-10-CM

## 2024-05-07 PROCEDURE — 99213 OFFICE O/P EST LOW 20 MIN: CPT | Performed by: PHYSICIAN ASSISTANT

## 2024-05-07 ASSESSMENT — PAIN SCALES - GENERAL: PAINLEVEL: NO PAIN (0)

## 2024-05-07 ASSESSMENT — ENCOUNTER SYMPTOMS: COUGH: 1

## 2024-05-07 NOTE — PROGRESS NOTES
Jessica Ardon is a 48 year old, presenting for the following health issues:  Cough      She has 17 yo twins and one of her kids was sick a few months ago at the same time as her.    Had URI in Dec and Feb and on abx both times.  They were both virtual appts    Has nasal congestion again x 10d  Cough started 5-6 days ago  Sputum is thick and making her chest hurt  No fever   No facial pressure  No sick kids at home and no sick co workers  Denies hx of asthma or needing an inhaler  Smoked until about 10 years ago  No recent travel    Via the Health Maintenance questionnaire, the patient has reported the following services have been completed -Cervical Cancer Screening, this information has been sent to the abstraction team.  History of Present Illness       Reason for visit:  Bronchitis maybe?  Symptom onset:  3-4 weeks ago  Symptom intensity:  Moderate  Symptom progression:  Worsening  Had these symptoms before:  Yes  Has tried/received treatment for these symptoms:  Yes  Previous treatment was successful:  Yes  Prior treatment description:  Antibiotic in december    She eats 2-3 servings of fruits and vegetables daily.She consumes 1 sweetened beverage(s) daily.She exercises with enough effort to increase her heart rate 20 to 29 minutes per day.  She exercises with enough effort to increase her heart rate 4 days per week. She is missing 3 dose(s) of medications per week.  She is not taking prescribed medications regularly due to side effects.       Past Medical History:   Diagnosis Date    Anxiety 08/2022    zoloft added    H/O colonoscopy 12/2022    nl    Iron deficiency anemia 02/20/2014    Seasonal allergies      Past Surgical History:   Procedure Laterality Date    COLONOSCOPY N/A 12/30/2022    Procedure: COLONOSCOPY;  Surgeon: Cleo Lees MD;  Location:  GI    PHOTOREFRACTIVE KERATECTOMY UNILATERAL STANDARD OR CUSTOMVUE W OR W/O MITOMYCIN Right 9/26/2016    Procedure: PHOTOREFRACTIVE  "KERATECTOMY UNILATERAL STANDARD OR CUSTOMVUE W OR W/O MITOMYCIN;  Surgeon: Rene Pena MD;  Location: [facility]    PHOTOREFRACTIVE KERATECTOMY UNILATERAL STANDARD OR CUSTOMVUE W OR W/O MITOMYCIN Left 2016    Procedure: PHOTOREFRACTIVE KERATECTOMY UNILATERAL STANDARD OR CUSTOMVUE W OR W/O MITOMYCIN;  Surgeon: Rene Pena MD;  Location: [facility]    TUBAL LIGATION  2008    WAVESCAN SCREENING Bilateral 2016    Procedure: WAVESCAN SCREENING;  Surgeon: Rene Pena MD;  Location: [facility]    ZZC C-SEC+POSTPARTUM CARE,PREV C-SEC  2008    Twins -      Social History     Tobacco Use    Smoking status: Former     Current packs/day: 0.00     Average packs/day: 0.5 packs/day for 14.6 years (7.3 ttl pk-yrs)     Types: Cigarettes     Start date: 1999     Quit date: 2013     Years since quitting: 10.7    Smokeless tobacco: Never   Substance Use Topics    Alcohol use: Yes     Comment: 1-2 drinks per day     Current Outpatient Medications   Medication Sig Dispense Refill    estradiol-norethindrone (COMBIPATCH) 0.05-0.14 MG/DAY bi-weekly patch Place 1 patch onto the skin twice a week 8 patch 3    Ferrous Sulfate (IRON SUPPLEMENT PO) \"blood builder:      magnesium 250 MG tablet        Allergies   Allergen Reactions    No Known Drug Allergy      FAMILY HISTORY NOTED AND REVIEWED    PHYSICAL EXAM:    /86 (BP Location: Left arm, Patient Position: Sitting, Cuff Size: Adult Regular)   Pulse 64   Temp 96.9  F (36.1  C) (Temporal)   Resp 16   Ht 1.757 m (5' 9.17\")   Wt 76.2 kg (168 lb)   SpO2 100%   BMI 24.69 kg/m      GENERAL APPEARANCE: healthy, alert and no distress  EYES: Eyes grossly normal to inspection, conjunctivae and sclerae normal  HENT: ear canals and TM's normal and nose and mouth without ulcers or lesions  NECK: no adenopathy, no asymmetry, masses, or scars and thyroid normal to palpation  RESP: lungs clear to auscultation - no rales, rhonchi or wheezes  CV: regular " rates and rhythm, normal S1 S2, no S3 or S4 and no murmur, click or rub    Assessment and Plan:     (J06.9) Viral URI with cough  (primary encounter diagnosis)  Comment:   Plan: Recd she cont tylenol, mucinex, fluids and rest. Suspect viral and do not think this is a continuous infection since Dec since she did improve for weeks in between infections.      Debra Crum PA-C

## 2024-06-05 NOTE — PROGRESS NOTES
Zara is a 48 year old  female who presents for annual exam.     Besides routine health maintenance, she has no other health concerns today .    HPI:  The patient's PCP is  Debra Crum PA-C.    Patient is here to establish with OB/Gyn for Breast/Pelvic exam.  She has no current concerns.      GYNECOLOGIC HISTORY:    Patient's last menstrual period was 2024 (exact date).    Regular menses? no  Menses every sparactic days.  Length of menses: 7 days.    Patient is sexually active.  STD testing offered?  Declined  Her current contraception method is: tubal ligation.  She  reports that she quit smoking about 10 years ago. Her smoking use included cigarettes. She started smoking about 25 years ago. She has a 7.3 pack-year smoking history. She has never used smokeless tobacco.      Answers submitted by the patient for this visit:  Patient Health Questionnaire (Submitted on 2024)  If you checked off any problems, how difficult have these problems made it for you to do your work, take care of things at home, or get along with other people?: Not difficult at all  PHQ9 TOTAL SCORE: 0  SHERIE-7 (Submitted on 2024)  SHERIE 7 TOTAL SCORE: 2      Last PHQ-9 score on record =       2024    10:02 AM   PHQ-9 SCORE   PHQ-9 Total Score MyChart 0   PHQ-9 Total Score 0     Last GAD7 score on record =       2024    10:03 AM   SHERIE-7 SCORE   Total Score 2 (minimal anxiety)   Total Score 2       HEALTH MAINTENANCE UPDATED: no    Cholesterol:   Recent Labs   Lab Test 20  0950   CHOL 218*   HDL 71   *   TRIG 109     Last Mammo:  2022 , Result: Normal, Next Mammo: Today 2024  Colonoscopy:  2022, Result: Normal, Next Colonoscopy: Repeat in 5 years.  Dexa:  Not applicable, Dexa Scan: Due at age 66.    PAP/HPV HISTORY:   Lab Results   Component Value Date    PAP/HPV NIL, neg HPV 2019    PAP/HPV NIL, neg HPV 2014     Health maintenance updated:      HISTORY:  OB History     Para Term  AB Living   4 3 3 0 1 0   SAB IAB Ectopic Multiple Live Births   1 0 0 0 0      # Outcome Date GA Lbr Dorian/2nd Weight Sex Type Anes PTL Lv   4 Term            3 Term            2 Term            1 SAB                Patient Active Problem List   Diagnosis    Neck pain    Iron deficiency anemia    Anxiety     Past Surgical History:   Procedure Laterality Date    COLONOSCOPY N/A 2022    Procedure: COLONOSCOPY;  Surgeon: Cleo Lees MD;  Location:  GI    PHOTOREFRACTIVE KERATECTOMY UNILATERAL STANDARD OR CUSTOMVUE W OR W/O MITOMYCIN Right 2016    Procedure: PHOTOREFRACTIVE KERATECTOMY UNILATERAL STANDARD OR CUSTOMVUE W OR W/O MITOMYCIN;  Surgeon: Rene Pena MD;  Location:  EC    PHOTOREFRACTIVE KERATECTOMY UNILATERAL STANDARD OR CUSTOMVUE W OR W/O MITOMYCIN Left 2016    Procedure: PHOTOREFRACTIVE KERATECTOMY UNILATERAL STANDARD OR CUSTOMVUE W OR W/O MITOMYCIN;  Surgeon: Rene Pena MD;  Location:  EC    TUBAL LIGATION  2008    WAVESCAN SCREENING Bilateral 2016    Procedure: WAVESCAN SCREENING;  Surgeon: Rene Pena MD;  Location:  EC    ZZC C-SEC+POSTPARTUM CARE,PREV C-SEC  2008    Twins -       Social History     Tobacco Use    Smoking status: Former     Current packs/day: 0.00     Average packs/day: 0.5 packs/day for 14.6 years (7.3 ttl pk-yrs)     Types: Cigarettes     Start date: 1999     Quit date: 2013     Years since quitting: 10.8    Smokeless tobacco: Never   Substance Use Topics    Alcohol use: Yes     Comment: 1-2 drinks per day      Problem (# of Occurrences) Relation (Name,Age of Onset)    Arthritis (1) Mother    Asthma (1) Son (Connor)    Connective Tissue Disorder (1) Brother (Willie)    Depression (1) Brother (Willie)    Hypertension (1) Father (Zachariah)    Lipids (1) Father (Zachariah)    Thyroid Disease (1) Brother (Willie): thyroid cancer, removed at age 42    Family History Negative (4) Mother,  "Son (Connor), Son, Son    Glaucoma (3) Paternal Grandmother, Paternal Grandfather (Suresh), Other    Other Cancer (1) Brother (Willie): thyroid    Hyperlipidemia (1) Father (Zachariah)    Colon Polyps (1) Father (Zachariah)    Rheumatoid Arthritis (1) Maternal Grandmother: and scleroderma    Colon Cancer (3) Paternal Grandfather (Suresh, 75), Paternal Aunt (68), Paternal Uncle (70)              Current Outpatient Medications   Medication Sig Dispense Refill    Ferrous Sulfate (IRON SUPPLEMENT PO) \"blood builder:      magnesium 250 MG tablet       estradiol-norethindrone (COMBIPATCH) 0.05-0.14 MG/DAY bi-weekly patch Place 1 patch onto the skin twice a week (Patient not taking: Reported on 6/7/2024) 8 patch 3     No current facility-administered medications for this visit.     Allergies   Allergen Reactions    No Known Drug Allergy        Past medical, surgical, social and family histories were reviewed and updated in [facility].      EXAM:  /70 (BP Location: Right arm, Patient Position: Sitting, Cuff Size: Adult Regular)   Ht 1.77 m (5' 9.69\")   Wt 76.1 kg (167 lb 12.8 oz)   LMP 05/28/2024 (Exact Date)   BMI 24.29 kg/m     BMI: Body mass index is 24.29 kg/m .    PHYSICAL EXAM:  Constitutional:   Appearance: Well nourished, well developed, alert, in no acute distress  Neck:  Lymph Nodes:  No lymphadenopathy present    Thyroid:  Gland size normal, nontender, no nodules or masses present  on palpation  Chest:  Respiratory Effort:  Breathing unlabored  Cardiovascular:    Heart: Auscultation:  Regular rate, normal rhythm, no murmurs present  Breasts: Palpation of Breasts and Axillae:  No masses present on palpation, no breast tenderness., Axillary Lymph Nodes:  No lymphadenopathy present., and No nodularity, asymmetry or nipple discharge bilaterally.  Gastrointestinal:   Abdominal Examination:  Abdomen nontender to palpation, tone normal without rigidity or guarding, no masses present, umbilicus without lesions   Liver and " Spleen:  No hepatomegaly present, liver nontender to palpation    Hernias:  No hernias present  Lymphatic: Lymph Nodes:  No other lymphadenopathy present  Skin:  General Inspection:  No rashes present, no lesions present, no areas of  discoloration  Neurologic:    Mental Status:  Oriented X3.  Normal strength and tone, sensory exam                grossly normal, mentation intact and speech normal.    Psychiatric:   Mentation appears normal and affect normal/bright.         Pelvic Exam:  External Genitalia:     Normal appearance for age, no discharge present, no tenderness present, no inflammatory lesions present, color normal  Vagina:     Normal vaginal vault without central or paravaginal defects, no discharge present, no inflammatory lesions present, no masses present  Bladder:     Nontender to palpation  Urethra:   Urethral Body:  Urethra palpation normal, urethra structural support normal   Urethral Meatus:  No erythema or lesions present  Cervix:     Appearance healthy, no lesions present, nontender to palpation, no bleeding present. Pap collected  Uterus:     Uterus: firm, normal sized and nontender, anteverted in position.   Adnexa:     No adnexal tenderness present, no adnexal masses present  Perineum:     Perineum within normal limits, no evidence of trauma, no rashes or skin lesions present  Anus:     Anus within normal limits, no hemorrhoids present  Inguinal Lymph Nodes:     No lymphadenopathy present  Pubic Hair:     Normal pubic hair distribution for age  Genitalia and Groin:     No rashes present, no lesions present, no areas of discoloration, no masses present    COUNSELING:   Reviewed preventive health counseling, as reflected in patient instructions  Special attention given to:        Regular exercise       Healthy diet/nutrition       Colorectal Cancer Screening       (Maura)menopause management    BMI: Body mass index is 24.29 kg/m .      ASSESSMENT:  48 year old female with satisfactory annual  exam.    ICD-10-CM    1. Encounter for well woman exam with routine gynecological exam  Z01.419       2. Screening for cervical cancer  Z12.4 Gynecologic Cytology (Pap) and HPV          PLAN:  Normal breast and pelvic exam  Pap updated today.  If normal continue routine screening  Follow-up in 1 year for annual or sooner if needed.    Ynes Gonzáles MD

## 2024-06-07 ENCOUNTER — HOSPITAL ENCOUNTER (OUTPATIENT)
Dept: MAMMOGRAPHY | Facility: CLINIC | Age: 49
Discharge: HOME | End: 2024-06-07
Payer: COMMERCIAL

## 2024-06-07 ENCOUNTER — OFFICE VISIT (OUTPATIENT)
Dept: OBGYN | Facility: CLINIC | Age: 49
End: 2024-06-07
Payer: COMMERCIAL

## 2024-06-07 VITALS
DIASTOLIC BLOOD PRESSURE: 70 MMHG | BODY MASS INDEX: 24.02 KG/M2 | SYSTOLIC BLOOD PRESSURE: 111 MMHG | HEIGHT: 70 IN | WEIGHT: 167.8 LBS

## 2024-06-07 DIAGNOSIS — Z01.419 ENCOUNTER FOR GYNECOLOGICAL EXAMINATION WITHOUT ABNORMAL FINDING: ICD-10-CM

## 2024-06-07 DIAGNOSIS — Z12.4 SCREENING FOR CERVICAL CANCER: ICD-10-CM

## 2024-06-07 DIAGNOSIS — Z01.419 ENCOUNTER FOR WELL WOMAN EXAM WITH ROUTINE GYNECOLOGICAL EXAM: Primary | ICD-10-CM

## 2024-06-07 PROCEDURE — 87624 HPV HI-RISK TYP POOLED RSLT: CPT | Performed by: OBSTETRICS & GYNECOLOGY

## 2024-06-07 PROCEDURE — 99396 PREV VISIT EST AGE 40-64: CPT | Performed by: OBSTETRICS & GYNECOLOGY

## 2024-06-07 PROCEDURE — 99459 PELVIC EXAMINATION: CPT | Performed by: OBSTETRICS & GYNECOLOGY

## 2024-06-07 PROCEDURE — 77063 BREAST TOMOSYNTHESIS BI: CPT

## 2024-06-07 PROCEDURE — G0145 SCR C/V CYTO,THINLAYER,RESCR: HCPCS | Performed by: OBSTETRICS & GYNECOLOGY

## 2024-06-07 ASSESSMENT — PATIENT HEALTH QUESTIONNAIRE - PHQ9
SUM OF ALL RESPONSES TO PHQ QUESTIONS 1-9: 0
SUM OF ALL RESPONSES TO PHQ QUESTIONS 1-9: 0
10. IF YOU CHECKED OFF ANY PROBLEMS, HOW DIFFICULT HAVE THESE PROBLEMS MADE IT FOR YOU TO DO YOUR WORK, TAKE CARE OF THINGS AT HOME, OR GET ALONG WITH OTHER PEOPLE: NOT DIFFICULT AT ALL

## 2024-06-07 ASSESSMENT — ANXIETY QUESTIONNAIRES
5. BEING SO RESTLESS THAT IT IS HARD TO SIT STILL: NOT AT ALL
4. TROUBLE RELAXING: SEVERAL DAYS
IF YOU CHECKED OFF ANY PROBLEMS ON THIS QUESTIONNAIRE, HOW DIFFICULT HAVE THESE PROBLEMS MADE IT FOR YOU TO DO YOUR WORK, TAKE CARE OF THINGS AT HOME, OR GET ALONG WITH OTHER PEOPLE: NOT DIFFICULT AT ALL
GAD7 TOTAL SCORE: 2
7. FEELING AFRAID AS IF SOMETHING AWFUL MIGHT HAPPEN: NOT AT ALL
3. WORRYING TOO MUCH ABOUT DIFFERENT THINGS: NOT AT ALL
2. NOT BEING ABLE TO STOP OR CONTROL WORRYING: NOT AT ALL
GAD7 TOTAL SCORE: 2
GAD7 TOTAL SCORE: 2
1. FEELING NERVOUS, ANXIOUS, OR ON EDGE: NOT AT ALL
6. BECOMING EASILY ANNOYED OR IRRITABLE: SEVERAL DAYS
7. FEELING AFRAID AS IF SOMETHING AWFUL MIGHT HAPPEN: NOT AT ALL
8. IF YOU CHECKED OFF ANY PROBLEMS, HOW DIFFICULT HAVE THESE MADE IT FOR YOU TO DO YOUR WORK, TAKE CARE OF THINGS AT HOME, OR GET ALONG WITH OTHER PEOPLE?: NOT DIFFICULT AT ALL

## 2024-06-09 ENCOUNTER — HEALTH MAINTENANCE LETTER (OUTPATIENT)
Age: 49
End: 2024-06-09

## 2024-06-11 LAB
HPV HR 12 DNA CVX QL NAA+PROBE: NEGATIVE
HPV16 DNA CVX QL NAA+PROBE: NEGATIVE
HPV18 DNA CVX QL NAA+PROBE: NEGATIVE
HUMAN PAPILLOMA VIRUS FINAL DIAGNOSIS: NORMAL

## 2024-06-13 LAB
BKR LAB AP GYN ADEQUACY: NORMAL
BKR LAB AP GYN INTERPRETATION: NORMAL
BKR LAB AP LMP: NORMAL
BKR LAB AP PREVIOUS ABNORMAL: NORMAL
PATH REPORT.COMMENTS IMP SPEC: NORMAL
PATH REPORT.COMMENTS IMP SPEC: NORMAL
PATH REPORT.RELEVANT HX SPEC: NORMAL

## 2024-06-14 ENCOUNTER — HOSPITAL ENCOUNTER (OUTPATIENT)
Dept: MAMMOGRAPHY | Facility: CLINIC | Age: 49
Discharge: HOME | End: 2024-06-14
Payer: COMMERCIAL

## 2024-06-14 DIAGNOSIS — R92.8 ABNORMAL MAMMOGRAM: ICD-10-CM

## 2024-06-14 PROCEDURE — 76642 ULTRASOUND BREAST LIMITED: CPT | Mod: RT

## 2024-06-14 PROCEDURE — 77061 BREAST TOMOSYNTHESIS UNI: CPT | Mod: RT

## 2024-07-30 DIAGNOSIS — M25.572 LEFT ANKLE PAIN, UNSPECIFIED CHRONICITY: Primary | ICD-10-CM

## 2024-07-31 ENCOUNTER — OFFICE VISIT (OUTPATIENT)
Dept: ORTHOPEDICS | Facility: CLINIC | Age: 49
End: 2024-07-31
Payer: COMMERCIAL

## 2024-07-31 ENCOUNTER — ANCILLARY PROCEDURE (OUTPATIENT)
Dept: GENERAL RADIOLOGY | Facility: CLINIC | Age: 49
End: 2024-07-31
Attending: FAMILY MEDICINE
Payer: COMMERCIAL

## 2024-07-31 DIAGNOSIS — M25.372 ANKLE INSTABILITY, LEFT: ICD-10-CM

## 2024-07-31 DIAGNOSIS — S93.409A SPRAIN OF LATERAL LIGAMENT OF ANKLE JOINT: Primary | ICD-10-CM

## 2024-07-31 DIAGNOSIS — M25.572 LEFT ANKLE PAIN, UNSPECIFIED CHRONICITY: ICD-10-CM

## 2024-07-31 PROCEDURE — 99203 OFFICE O/P NEW LOW 30 MIN: CPT | Performed by: FAMILY MEDICINE

## 2024-07-31 PROCEDURE — 73610 X-RAY EXAM OF ANKLE: CPT | Mod: LT | Performed by: RADIOLOGY

## 2024-07-31 SDOH — HEALTH STABILITY: PHYSICAL HEALTH: ON AVERAGE, HOW MANY DAYS PER WEEK DO YOU ENGAGE IN MODERATE TO STRENUOUS EXERCISE (LIKE A BRISK WALK)?: 5 DAYS

## 2024-07-31 SDOH — HEALTH STABILITY: PHYSICAL HEALTH: ON AVERAGE, HOW MANY MINUTES DO YOU ENGAGE IN EXERCISE AT THIS LEVEL?: 50 MIN

## 2024-07-31 NOTE — LETTER
7/31/2024      RE: Zara Roberts  [address] [address]     Dear Colleague,    Thank you for referring your patient, Zara Roberts, to the [facility] [place]. Please see a copy of my visit note below.    Sports Medicine Clinic Visit    PCP: Debra Crum    Zara Roberts is a 49 year old female who is seen  in consultation at the request of Dr. Crum presenting with left ankle swelling    Injury: Pt notes 1 month ago she was walking after playing tennis she stepped on the concrete weird and inverted her ankle. Then two weeks after, she noted re-tweaking it while stepping on a rock during a hike     Location of Pain: left lateral ankle  Duration of Pain: 1 month(s) but notes recurrent ankle sprains since she was 17 years old  Rating of Pain: 6/10  Pain is better with: Arnica, elevation, ice, brace  Pain is worse with: walking, after weight bearing for long periods  Additional Features: swelling, instability  Treatment so far consists of: Arnica, elevation, ice, brace  Prior History of related problems: Reccurent ankle sprains over the last 30 years and a stress fx at age 19, previous h/o calf injury    LMP 05/28/2024 (Exact Date)           Patient has been an , ,     PMH:  Past Medical History:   Diagnosis Date     Anxiety 08/2022    zoloft added     H/O colonoscopy 12/2022    nl     Iron deficiency anemia 02/20/2014     Seasonal allergies        Active problem list:  Patient Active Problem List   Diagnosis     Neck pain     Iron deficiency anemia     Anxiety       FH:  Family History   Problem Relation Age of Onset     Family History Negative Mother      Arthritis Mother      Lipids Father      Hypertension Father      Hyperlipidemia Father      Colon Polyps Father      Connective Tissue Disorder Brother      Other Cancer Brother         thyroid     Depression Brother      Thyroid Disease  Brother         thyroid cancer, removed at age 42     Rheumatoid Arthritis Maternal Grandmother         and scleroderma     Glaucoma Paternal Grandmother      Glaucoma Paternal Grandfather      Colon Cancer Paternal Grandfather 75     Family History Negative Son      Asthma Son      Family History Negative Son      Family History Negative Son      Colon Cancer Paternal Aunt 68     Colon Cancer Paternal Uncle 70     Glaucoma Other        SH:  Social History     Socioeconomic History     Marital status:      Spouse name: Daniel     Number of children: 3     Years of education: Not on file     Highest education level: Not on file   Occupational History     Occupation:  - IT     Employer: [employer]   Tobacco Use     Smoking status: Former     Current packs/day: 0.00     Average packs/day: 0.5 packs/day for 14.6 years (7.3 ttl pk-yrs)     Types: Cigarettes     Start date: 1999     Quit date: 2013     Years since quittin.0     Smokeless tobacco: Never   Vaping Use     Vaping status: Never Used   Substance and Sexual Activity     Alcohol use: Yes     Comment: 1-2 drinks per day     Drug use: No     Sexual activity: Yes     Partners: Male     Birth control/protection: Female Surgical   Other Topics Concern     Parent/sibling w/ CABG, MI or angioplasty before 65F 55M? 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     Social Determinants of Health     Financial Resource Strain: Not on file   Food Insecurity: Not on file   Transportation Needs: Not on file   Physical Activity: Not on file   Stress: Not on file   Social Connections: Not on file   Interpersonal Safety: Not on file   Housing Stability: Not on file       MEDS:  See EMR, reviewed  ALL:  See EMR, reviewed    REVIEW OF SYSTEMS:  CONSTITUTIONAL:NEGATIVE for fever, chills, change in weight  INTEGUMENTARY/SKIN: NEGATIVE  for worrisome rashes, moles or lesions  EYES: NEGATIVE for vision changes or irritation  ENT/MOUTH: NEGATIVE for ear, mouth and throat problems  RESP:NEGATIVE for significant cough or SOB  BREAST: NEGATIVE for masses, tenderness or discharge  CV: NEGATIVE for chest pain, palpitations or peripheral edema  GI: NEGATIVE for nausea, abdominal pain, heartburn, or change in bowel habits  :NEGATIVE for frequency, dysuria, or hematuria  :NEGATIVE for frequency, dysuria, or hematuria  NEURO: NEGATIVE for weakness, dizziness or paresthesias  ENDOCRINE: NEGATIVE for temperature intolerance, skin/hair changes  HEME/ALLERGY/IMMUNE: NEGATIVE for bleeding problems  PSYCHIATRIC: NEGATIVE for changes in mood or affect        Objective: There is no tenderness in the calf or tenderness about the Achilles tendon.  No significant swelling at the ankle.  Tender at the anterior talofibular ligament but nontender over the calcaneofibular or posterior talofibular ligament.  Nontender over the deltoid.  Nontender over the syndesmosis.  Nontender over the proximal fifth metatarsal or the area of Lisfranc.  A anterior drawer is positive compared to the nonaffected ankle.  Talar tilt is negative.  External rotation test is negative.  Overlying skin is normal.  Appropriate conversation and affect.    I personally viewed the patient x-rays of the ankle that show normal-appearing mortise with no degenerative change.  Calcification at the distal tibia that could be consistent with old injury.  She has calcification in the soft tissue posterior to the mid shin level that could be consistent with myositis ossificans.  There are no previous x-rays to compare to.  She is asymptomatic in that area on clinical exam.    Assessment: Acute left-sided acute ankle sprain.  Past history of multiple ankle sprains.  Ankle instability.  X-ray suggesting myositis ossificans, radiology over read pending    Plan: Patient would like to start with physical therapy  for history of multiple ankle sprains.  She would like to see physical therapy in [place].  I suggested a lace up ankle brace for sports.  We discussed indications for ankle reconstruction if needed in the future.  She had no further questions and follow-up as needed.                    Again, thank you for allowing me to participate in the care of your patient.      Sincerely,    Silvano Brown MD

## 2024-07-31 NOTE — PROGRESS NOTES
Sports Medicine Clinic Visit    PCP: Debra Crum Kelsy Roberts is a 49 year old female who is seen  in consultation at the request of Dr. Crum presenting with left ankle swelling    Injury: Pt notes 1 month ago she was walking after playing tennis she stepped on the concrete weird and inverted her ankle. Then two weeks after, she noted re-tweaking it while stepping on a rock during a hike     Location of Pain: left lateral ankle  Duration of Pain: 1 month(s) but notes recurrent ankle sprains since she was 17 years old  Rating of Pain: 6/10  Pain is better with: Arnica, elevation, ice, brace  Pain is worse with: walking, after weight bearing for long periods  Additional Features: swelling, instability  Treatment so far consists of: Arnica, elevation, ice, brace  Prior History of related problems: Reccurent ankle sprains over the last 30 years and a stress fx at age 19, previous h/o calf injury    LMP 05/28/2024 (Exact Date)           Patient has been an , ,     PMH:  Past Medical History:   Diagnosis Date    Anxiety 08/2022    zoloft added    H/O colonoscopy 12/2022    nl    Iron deficiency anemia 02/20/2014    Seasonal allergies        Active problem list:  Patient Active Problem List   Diagnosis    Neck pain    Iron deficiency anemia    Anxiety       FH:  Family History   Problem Relation Age of Onset    Family History Negative Mother     Arthritis Mother     Lipids Father     Hypertension Father     Hyperlipidemia Father     Colon Polyps Father     Connective Tissue Disorder Brother     Other Cancer Brother         thyroid    Depression Brother     Thyroid Disease Brother         thyroid cancer, removed at age 42    Rheumatoid Arthritis Maternal Grandmother         and scleroderma    Glaucoma Paternal Grandmother     Glaucoma Paternal Grandfather     Colon Cancer Paternal Grandfather 75    Family History Negative Son     Asthma Son     Family History  Negative Son     Family History Negative Son     Colon Cancer Paternal Aunt 68    Colon Cancer Paternal Uncle 70    Glaucoma Other        SH:  Social History     Socioeconomic History    Marital status:      Spouse name: Daniel    Number of children: 3    Years of education: Not on file    Highest education level: Not on file   Occupational History    Occupation:  - IT     Employer: [employer]   Tobacco Use    Smoking status: Former     Current packs/day: 0.00     Average packs/day: 0.5 packs/day for 14.6 years (7.3 ttl pk-yrs)     Types: Cigarettes     Start date: 1999     Quit date: 2013     Years since quittin.0    Smokeless tobacco: Never   Vaping Use    Vaping status: Never Used   Substance and Sexual Activity    Alcohol use: Yes     Comment: 1-2 drinks per day    Drug use: No    Sexual activity: Yes     Partners: Male     Birth control/protection: Female Surgical   Other Topics Concern    Parent/sibling w/ CABG, MI or angioplasty before 65F 55M? 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     Social Determinants of Health     Financial Resource Strain: Not on file   Food Insecurity: Not on file   Transportation Needs: Not on file   Physical Activity: Not on file   Stress: Not on file   Social Connections: Not on file   Interpersonal Safety: Not on file   Housing Stability: Not on file       MEDS:  See EMR, reviewed  ALL:  See EMR, reviewed    REVIEW OF SYSTEMS:  CONSTITUTIONAL:NEGATIVE for fever, chills, change in weight  INTEGUMENTARY/SKIN: NEGATIVE for worrisome rashes, moles or lesions  EYES: NEGATIVE for vision changes or irritation  ENT/MOUTH: NEGATIVE for ear, mouth and throat problems  RESP:NEGATIVE for significant cough or SOB  BREAST: NEGATIVE for masses, tenderness or discharge  CV: NEGATIVE for chest pain, palpitations or peripheral edema  GI:  NEGATIVE for nausea, abdominal pain, heartburn, or change in bowel habits  :NEGATIVE for frequency, dysuria, or hematuria  :NEGATIVE for frequency, dysuria, or hematuria  NEURO: NEGATIVE for weakness, dizziness or paresthesias  ENDOCRINE: NEGATIVE for temperature intolerance, skin/hair changes  HEME/ALLERGY/IMMUNE: NEGATIVE for bleeding problems  PSYCHIATRIC: NEGATIVE for changes in mood or affect        Objective: There is no tenderness in the calf or tenderness about the Achilles tendon.  No significant swelling at the ankle.  Tender at the anterior talofibular ligament but nontender over the calcaneofibular or posterior talofibular ligament.  Nontender over the deltoid.  Nontender over the syndesmosis.  Nontender over the proximal fifth metatarsal or the area of Lisfranc.  A anterior drawer is positive compared to the nonaffected ankle.  Talar tilt is negative.  External rotation test is negative.  Overlying skin is normal.  Appropriate conversation and affect.    I personally viewed the patient x-rays of the ankle that show normal-appearing mortise with no degenerative change.  Calcification at the distal tibia that could be consistent with old injury.  She has calcification in the soft tissue posterior to the mid shin level that could be consistent with myositis ossificans.  There are no previous x-rays to compare to.  She is asymptomatic in that area on clinical exam.    Assessment: Acute left-sided acute ankle sprain.  Past history of multiple ankle sprains.  Ankle instability.  X-ray suggesting myositis ossificans, radiology over read pending    Plan: Patient would like to start with physical therapy for history of multiple ankle sprains.  She would like to see physical therapy in [place].  I suggested a lace up ankle brace for sports.  We discussed indications for ankle reconstruction if needed in the future.  She had no further questions and follow-up as needed.

## 2024-08-22 ENCOUNTER — THERAPY VISIT (OUTPATIENT)
Dept: PHYSICAL THERAPY | Facility: CLINIC | Age: 49
End: 2024-08-22
Payer: COMMERCIAL

## 2024-08-22 DIAGNOSIS — M25.572 PAIN IN JOINT INVOLVING ANKLE AND FOOT, LEFT: Primary | ICD-10-CM

## 2024-08-22 DIAGNOSIS — M25.372 ANKLE INSTABILITY, LEFT: ICD-10-CM

## 2024-08-22 DIAGNOSIS — S93.409A SPRAIN OF LATERAL LIGAMENT OF ANKLE JOINT: ICD-10-CM

## 2024-08-22 PROCEDURE — 97110 THERAPEUTIC EXERCISES: CPT | Mod: GP | Performed by: PHYSICAL THERAPIST

## 2024-08-22 PROCEDURE — 97530 THERAPEUTIC ACTIVITIES: CPT | Mod: GP | Performed by: PHYSICAL THERAPIST

## 2024-08-22 PROCEDURE — 97161 PT EVAL LOW COMPLEX 20 MIN: CPT | Mod: GP | Performed by: PHYSICAL THERAPIST

## 2024-08-22 ASSESSMENT — ACTIVITIES OF DAILY LIVING (ADL)
WALKING_BETWEEN_ROOMS: NO DIFFICULTY
LEFS_RAW_SCORE: 0
GOING_UP_OR_DOWN_10_STAIRS: A LITTLE BIT OF DIFFICULTY
SQUATTING: NO DIFFICULTY
PERFORMING_HEAVY_ACTIVITIES_AROUND_YOUR_HOME: MODERATE DIFFICULTY
ROLLING_OVER_IN_BED: NO DIFFICULTY
YOUR_USUAL_HOBBIES,_RECREATIONAL_OR_SPORTING_ACTIVITIES: MODERATE DIFFICULTY
WALKING_A_MILE: NO DIFFICULTY
PLEASE_INDICATE_YOR_PRIMARY_REASON_FOR_REFERRAL_TO_THERAPY:: FOOT AND/OR ANKLE
MAKING_SHARP_TURNS_WHILE_RUNNING_FAST: MODERATE DIFFICULTY
STANDING_FOR_1_HOUR: NO DIFFICULTY
PUTTING_ON_YOUR_SHOES_OR_SOCKS: NO DIFFICULTY
LIFTING_AN_OBJECT,_LIKE_A_BAG_OF_GROCERIES_FROM_THE_FLOOR: NO DIFFICULTY
PERFORMING_LIGHT_ACTIVITIES_AROUND_YOUR_HOME: A LITTLE BIT OF DIFFICULTY
RUNNING_ON_EVEN_GROUND: MODERATE DIFFICULTY
SHOPPING: MODERATE DIFFICULTY
GETTING_INTO_AND_OUT_OF_A_BATH: NO DIFFICULTY
LEFS_SCORE(%): 0
RUNNING_ON_UNEVEN_GROUND: QUITE A BIT OF DIFFICULTY
ANY_OF_YOUR_USUAL_WORK,_HOUSEWORK_OR_SCHOOL_ACTIVITIES: A LITTLE BIT OF DIFFICULTY
WALKING_2_BLOCKS: NO DIFFICULTY
SITTING_FOR_1_HOUR: NO DIFFICULTY
GETTING_INTO_OR_OUT_OF_A_CAR: NO DIFFICULTY

## 2024-08-23 PROBLEM — M25.572 PAIN IN JOINT INVOLVING ANKLE AND FOOT, LEFT: Status: ACTIVE | Noted: 2024-08-23

## 2024-08-23 NOTE — PROGRESS NOTES
PHYSICAL THERAPY EVALUATION  Type of Visit: Evaluation     Fall Risk Screen:  Fall screen completed by: PT  Have you fallen 2 or more times in the past year?: No  Have you fallen and had an injury in the past year?: No  Is patient a fall risk?: No    Subjective       Presenting condition or subjective complaint: ankle stiffness and flexibility. Patient injured her L ankle 6 weeks ago when she rolled it stepping on an uneven sidewalk after playing tennis. She then did the same thing a few weeks later while hiking. Patient has an extensive hx of L ankle sprains but has never formally addressed the problem. She feels it is now much easier to sprain it than before. Patient normally plays tennis regularly but has not attempted since this injury.  Date of onset: 25    Relevant medical history:   Past Medical History:   Diagnosis Date    Anxiety 2022    zoloft added    H/O colonoscopy 2022    nl    Iron deficiency anemia 2014    Seasonal allergies          Dates & types of surgery: cescarian    Past Surgical History:   Procedure Laterality Date    COLONOSCOPY N/A 2022    Procedure: COLONOSCOPY;  Surgeon: Cleo Lees MD;  Location:  GI    PHOTOREFRACTIVE KERATECTOMY UNILATERAL STANDARD OR CUSTOMVUE W OR W/O MITOMYCIN Right 2016    Procedure: PHOTOREFRACTIVE KERATECTOMY UNILATERAL STANDARD OR CUSTOMVUE W OR W/O MITOMYCIN;  Surgeon: Rene Pena MD;  Location: [facility]    PHOTOREFRACTIVE KERATECTOMY UNILATERAL STANDARD OR CUSTOMVUE W OR W/O MITOMYCIN Left 2016    Procedure: PHOTOREFRACTIVE KERATECTOMY UNILATERAL STANDARD OR CUSTOMVUE W OR W/O MITOMYCIN;  Surgeon: Rene Pena MD;  Location: [facility]    TUBAL LIGATION  2008    WAVESCAN SCREENING Bilateral 2016    Procedure: WAVESCAN SCREENING;  Surgeon: Rene Pena MD;  Location: [facility]    ZZC C-SEC+POSTPARTUM CARE,PREV C-SEC  2008    Twins -          Prior diagnostic imaging/testing results: CT  scan     Prior therapy history for the same diagnosis, illness or injury: Yes      Prior Level of Function  Transfers: Independent  Ambulation: Independent  ADL: Independent      Living Environment  Social support: With family members   Type of home: House; 2-story   Stairs to enter the home:         Ramp: No   Stairs inside the home: Yes 14 Is there a railing: Yes     Help at home: None  Equipment owned:       Employment: Yes research  Hobbies/Interests: tennis    Patient goals for therapy: play spirts without spraining or soreness    Pain assessment: Location: L lateral ankle/Ratin/10 currently     Objective   FOOT/ANKLE EVALUATION  PAIN: Pain is Exacerbated By: prolonged walking , running/tennis  INTEGUMENTARY (edema, incisions):  edema has dissipated at lateral malleolus  GAIT:   Weightbearing Status: WBAT  Assistive Device(s): None  Gait Deviations: WNL  BALANCE/PROPRIOCEPTION: Single Leg Stance Eyes Open (seconds): 10 seconds L; 20 seconds R  WEIGHT BEARING ALIGNMENT: WNL  NON-WEIGHTBEARING ALIGNMENT:  l ankle rests in a slightly inverted position    ROM:  AROM L DF 10; PF 45; Inv 35; Ev 2. R DF 12; PF 72; Inv 45; Ev 15  STRENGTH:  L DF 4+/5; PF 4-/5; Inv 4-/5; Ev 4-/5  SPECIAL TESTS:  (+) talar tilt L  PALPATION:  tender at ant/post talofibular ligaments  JOINT MOBILITY:  hypomobility at L ST and TC joints    Assessment & Plan   CLINICAL IMPRESSIONS  Medical Diagnosis: L ankle pain    Treatment Diagnosis: L ankle pain   Impression/Assessment: Patient is a 49 year old female with L ankle complaints.  The following significant findings have been identified: Decreased ROM/flexibility, Decreased joint mobility, Decreased strength, Decreased proprioception, Impaired muscle performance, and Decreased activity tolerance. These impairments interfere with their ability to perform recreational activities and community mobility as compared to previous level of function.     Clinical Decision Making  (Complexity):  Clinical Presentation: Stable/Uncomplicated  Clinical Presentation Rationale: based on medical and personal factors listed in PT evaluation  Clinical Decision Making (Complexity): Low complexity    PLAN OF CARE  Treatment Interventions:  Interventions: Neuromuscular Re-education, Therapeutic Activity, Therapeutic Exercise, Self-Care/Home Management    Long Term Goals     PT Goal 1  Goal Identifier: ambulation  Goal Description: minutes patient will be able to walk on uneven terrain without pain - 60  Rationale: to maximize safety and independence within the community  Target Date: 10/24/24      Frequency of Treatment: 2 x month  Duration of Treatment: 2 months    Education Assessment:   Learner/Method: Patient;Listening;Demonstration    Risks and benefits of evaluation/treatment have been explained.   Patient/Family/caregiver agrees with Plan of Care.     Evaluation Time:     PT Eval, Low Complexity Minutes (38152): 16       Signing Clinician: Taty Mcallister, PT

## 2024-12-09 ENCOUNTER — OFFICE VISIT (OUTPATIENT)
Dept: OPHTHALMOLOGY | Facility: CLINIC | Age: 49
End: 2024-12-09
Attending: OPHTHALMOLOGY
Payer: COMMERCIAL

## 2024-12-09 DIAGNOSIS — H52.211 IRREGULAR ASTIGMATISM OF RIGHT EYE: Primary | ICD-10-CM

## 2024-12-09 PROCEDURE — 99213 OFFICE O/P EST LOW 20 MIN: CPT | Performed by: OPHTHALMOLOGY

## 2024-12-09 PROCEDURE — 92015 DETERMINE REFRACTIVE STATE: CPT

## 2024-12-09 PROCEDURE — 92025 CPTRIZED CORNEAL TOPOGRAPHY: CPT | Performed by: OPHTHALMOLOGY

## 2024-12-09 ASSESSMENT — CONF VISUAL FIELD
OD_SUPERIOR_TEMPORAL_RESTRICTION: 0
OS_INFERIOR_TEMPORAL_RESTRICTION: 0
OS_SUPERIOR_TEMPORAL_RESTRICTION: 0
OS_INFERIOR_NASAL_RESTRICTION: 0
OD_NORMAL: 1
OD_INFERIOR_NASAL_RESTRICTION: 0
METHOD: COUNTING FINGERS
OD_SUPERIOR_NASAL_RESTRICTION: 0
OS_NORMAL: 1
OS_SUPERIOR_NASAL_RESTRICTION: 0
OD_INFERIOR_TEMPORAL_RESTRICTION: 0

## 2024-12-09 ASSESSMENT — REFRACTION_MANIFEST
OS_SPHERE: -0.25
OD_AXIS: 113
OS_AXIS: 060
OD_CYLINDER: +3.00
OD_ADD: +2.00
OS_CYLINDER: +0.75
OD_SPHERE: -3.00
OS_ADD: +2.00

## 2024-12-09 ASSESSMENT — VISUAL ACUITY
METHOD: SNELLEN - LINEAR
OD_CC+: +2
OS_CC: 20/30
CORRECTION_TYPE: GLASSES
OD_PH_CC: 20/25
OS_CC+: -1
OS_PH_CC+: -2
OD_CC: 20/40
OS_PH_CC: 20/20

## 2024-12-09 ASSESSMENT — TONOMETRY
OD_IOP_MMHG: 12
IOP_METHOD: ICARE
OS_IOP_MMHG: 12

## 2024-12-09 ASSESSMENT — SLIT LAMP EXAM - LIDS
COMMENTS: NORMAL
COMMENTS: NORMAL

## 2024-12-09 ASSESSMENT — REFRACTION_WEARINGRX
OS_SPHERE: +0.25
OD_AXIS: 118
OD_SPHERE: -2.25
OS_AXIS: 099
OD_CYLINDER: +3.25
OS_CYLINDER: +1.25
SPECS_TYPE: SVL

## 2024-12-09 NOTE — NURSING NOTE
"Chief Complaints and History of Present Illnesses   Patient presents with    Decreased Vision Evaluation     Chief Complaint(s) and History of Present Illness(es)       Decreased Vision Evaluation              Laterality: left eye              Comments    Patient states last eye exam was about a year and a half ago. Glare with night driving left eye over the past year. Some \"tremoring\"with eyes noticed, mostly left eye, very bothersome throughout the day. Ocular fatigue noticed by end of day with some mild pain. Taking saline tears as needed for that. No floaters or flashes of light. No recent surgeries, following S/p PRK both eyes (2016). No past ocular injuries. Discontinued CL usage, wears glasses during the day for screens and night driving. No DM.    Marie Jimenez on 12/9/2024 at 8:35 AM                     "

## 2024-12-09 NOTE — PROGRESS NOTES
"Chief complaint   Cornea eval    HPI    Zara Roberts 49 year old female       Chief Complaint(s) and History of Present Illness(es)       Decreased Vision Evaluation    In left eye.             Comments    Patient states last eye exam was about a year and a half ago. Glare with night driving left eye over the past year. Some \"tremoring\"with eyes noticed, mostly left eye, very bothersome throughout the day. Ocular fatigue noticed by end of day with some mild pain. Taking saline tears as needed for that. No floaters or flashes of light. No recent surgeries, following S/p PRK both eyes (2016). No past ocular injuries. Discontinued CL usage, wears glasses during the day for screens and night driving. No DM.    Marie Tonsfeldt on 12/9/2024 at 8:35 AM                   Past ocular history   Prior eye surgery/laser/Trauma: -  CTL wearer:No  Glasses : -  Family Hx of eye disease: -    PMH     Past Medical History:   Diagnosis Date    Anxiety 08/2022    zoloft added    H/O colonoscopy 12/2022    nl    Iron deficiency anemia 02/20/2014    Seasonal allergies        PSH     Past Surgical History:   Procedure Laterality Date    COLONOSCOPY N/A 12/30/2022    Procedure: COLONOSCOPY;  Surgeon: Cleo Lees MD;  Location:  GI    LASIK  2016?    In your records    PHOTOREFRACTIVE KERATECTOMY UNILATERAL STANDARD OR CUSTOMVUE W OR W/O MITOMYCIN Right 09/26/2016    Procedure: PHOTOREFRACTIVE KERATECTOMY UNILATERAL STANDARD OR CUSTOMVUE W OR W/O MITOMYCIN;  Surgeon: Rene Pena MD;  Location: [facility]    PHOTOREFRACTIVE KERATECTOMY UNILATERAL STANDARD OR CUSTOMVUE W OR W/O MITOMYCIN Left 09/26/2016    Procedure: PHOTOREFRACTIVE KERATECTOMY UNILATERAL STANDARD OR CUSTOMVUE W OR W/O MITOMYCIN;  Surgeon: Rene Pena MD;  Location: [facility]    TUBAL LIGATION  2008    WAVESCAN SCREENING Bilateral 09/23/2016    Procedure: WAVESCAN SCREENING;  Surgeon: Rene Pena MD;  Location: [facility]    ZZC C-SEC+POSTPARTUM " "CARE,PREV C-SEC  2008    Twins -        Meds     Current Outpatient Medications   Medication Sig Dispense Refill    Ferrous Sulfate (IRON SUPPLEMENT PO) \"blood builder:      magnesium 250 MG tablet        No current facility-administered medications for this visit.       Labs   -    Imaging   -    Drops Currently Taking   -    Assessment/Plan 2024   # irregular astigmatism right eye   s/p PRK each eye  Decentered ablation right eye  No signs of ectasia  Patient anisometrpia affecting the confort for reading   Advised not to use progressive and get separate reading glasses          Follow up:  Oph:  1 year VT MRX JAGDEEP      Attending Physician Attestation:  Complete documentation of historical and exam elements from today's encounter can be found in the full encounter summary report (not reduplicated in this progress note).  I personally obtained the chief complaint(s) and history of present illness.  I confirmed and edited as necessary the review of systems, past medical/surgical history, family history, social history, and examination findings as documented by others; and I examined the patient myself.  I personally reviewed the relevant tests, images, and reports as documented above.  I formulated and edited as necessary the assessment and plan and discussed the findings and management plan with the patient and family. - Conchita Hardin MD  "

## 2025-07-22 PROBLEM — M25.572 PAIN IN JOINT INVOLVING ANKLE AND FOOT, LEFT: Status: RESOLVED | Noted: 2024-08-23 | Resolved: 2025-07-22

## 2025-07-27 ENCOUNTER — HEALTH MAINTENANCE LETTER (OUTPATIENT)
Age: 50
End: 2025-07-27

## (undated) RX ORDER — FENTANYL CITRATE 50 UG/ML
INJECTION, SOLUTION INTRAMUSCULAR; INTRAVENOUS
Status: DISPENSED
Start: 2022-12-30